# Patient Record
Sex: FEMALE | Race: BLACK OR AFRICAN AMERICAN | NOT HISPANIC OR LATINO | Employment: PART TIME | URBAN - METROPOLITAN AREA
[De-identification: names, ages, dates, MRNs, and addresses within clinical notes are randomized per-mention and may not be internally consistent; named-entity substitution may affect disease eponyms.]

---

## 2019-06-26 ENCOUNTER — HOSPITAL ENCOUNTER (EMERGENCY)
Facility: HOSPITAL | Age: 54
Discharge: HOME | End: 2019-06-26
Attending: EMERGENCY MEDICINE | Admitting: EMERGENCY MEDICINE

## 2019-06-26 ENCOUNTER — APPOINTMENT (EMERGENCY)
Dept: CT IMAGING | Facility: HOSPITAL | Age: 54
End: 2019-06-26

## 2019-06-26 VITALS
RESPIRATION RATE: 16 BRPM | SYSTOLIC BLOOD PRESSURE: 159 MMHG | OXYGEN SATURATION: 100 % | DIASTOLIC BLOOD PRESSURE: 68 MMHG | HEART RATE: 67 BPM | WEIGHT: 149.91 LBS | TEMPERATURE: 98.4 F

## 2019-06-26 DIAGNOSIS — L30.9 ECZEMA: ICD-10-CM

## 2019-06-26 DIAGNOSIS — G43.909 MIGRAINE HEADACHE: Primary | ICD-10-CM

## 2019-06-26 PROCEDURE — 96375 TX/PRO/DX INJ NEW DRUG ADDON: CPT

## 2019-06-26 PROCEDURE — 96374 THER/PROPH/DIAG INJ IV PUSH: CPT

## 2019-06-26 PROCEDURE — 70450 CT HEAD/BRAIN W/O DYE: CPT

## 2019-06-26 PROCEDURE — 99284 EMERGENCY DEPT VISIT MOD MDM: CPT | Performed by: EMERGENCY MEDICINE

## 2019-06-26 PROCEDURE — 96361 HYDRATE IV INFUSION ADD-ON: CPT

## 2019-06-26 PROCEDURE — 99284 EMERGENCY DEPT VISIT MOD MDM: CPT

## 2019-06-26 RX ORDER — CETIRIZINE HYDROCHLORIDE 10 MG/1
10 TABLET ORAL DAILY
Qty: 20 TABLET | Refills: 0 | Status: SHIPPED | OUTPATIENT
Start: 2019-06-26

## 2019-06-26 RX ORDER — METOCLOPRAMIDE HYDROCHLORIDE 5 MG/ML
10 INJECTION INTRAMUSCULAR; INTRAVENOUS ONCE
Status: COMPLETED | OUTPATIENT
Start: 2019-06-26 | End: 2019-06-26

## 2019-06-26 RX ORDER — KETOROLAC TROMETHAMINE 30 MG/ML
30 INJECTION, SOLUTION INTRAMUSCULAR; INTRAVENOUS ONCE
Status: COMPLETED | OUTPATIENT
Start: 2019-06-26 | End: 2019-06-26

## 2019-06-26 RX ORDER — BUTALBITAL/ASPIRIN/CAFFEINE 50-325-40
1 CAPSULE ORAL EVERY 6 HOURS PRN
Qty: 15 CAPSULE | Refills: 0 | Status: SHIPPED | OUTPATIENT
Start: 2019-06-26 | End: 2019-07-06

## 2019-06-26 RX ORDER — TRIAMCINOLONE ACETONIDE 1 MG/G
CREAM TOPICAL 2 TIMES DAILY
Qty: 30 G | Refills: 0 | Status: SHIPPED | OUTPATIENT
Start: 2019-06-26

## 2019-06-26 RX ORDER — DIPHENHYDRAMINE HYDROCHLORIDE 50 MG/ML
25 INJECTION INTRAMUSCULAR; INTRAVENOUS ONCE
Status: COMPLETED | OUTPATIENT
Start: 2019-06-26 | End: 2019-06-26

## 2019-06-26 RX ORDER — NAPROXEN 500 MG/1
500 TABLET ORAL 2 TIMES DAILY WITH MEALS
Qty: 20 TABLET | Refills: 0 | Status: SHIPPED | OUTPATIENT
Start: 2019-06-26

## 2019-06-26 RX ORDER — ONDANSETRON 4 MG/1
4 TABLET, FILM COATED ORAL EVERY 6 HOURS
Qty: 12 TABLET | Refills: 0 | Status: SHIPPED | OUTPATIENT
Start: 2019-06-26

## 2019-06-26 RX ADMIN — KETOROLAC TROMETHAMINE 30 MG: 30 INJECTION, SOLUTION INTRAMUSCULAR; INTRAVENOUS at 10:49

## 2019-06-26 RX ADMIN — SODIUM CHLORIDE 1000 ML: 0.9 INJECTION, SOLUTION INTRAVENOUS at 09:28

## 2019-06-26 RX ADMIN — METOCLOPRAMIDE 10 MG: 5 INJECTION, SOLUTION INTRAMUSCULAR; INTRAVENOUS at 09:30

## 2019-06-26 RX ADMIN — DIPHENHYDRAMINE HYDROCHLORIDE 25 MG: 50 INJECTION, SOLUTION INTRAMUSCULAR; INTRAVENOUS at 09:28

## 2021-05-03 ENCOUNTER — IMMUNIZATIONS (OUTPATIENT)
Dept: FAMILY MEDICINE CLINIC | Facility: HOSPITAL | Age: 56
End: 2021-05-03

## 2021-05-03 DIAGNOSIS — Z23 ENCOUNTER FOR IMMUNIZATION: Primary | ICD-10-CM

## 2021-05-03 PROCEDURE — 91301 SARS-COV-2 / COVID-19 MRNA VACCINE (MODERNA) 100 MCG: CPT

## 2021-05-03 PROCEDURE — 0011A SARS-COV-2 / COVID-19 MRNA VACCINE (MODERNA) 100 MCG: CPT

## 2021-06-12 ENCOUNTER — IMMUNIZATIONS (OUTPATIENT)
Dept: FAMILY MEDICINE CLINIC | Facility: HOSPITAL | Age: 56
End: 2021-06-12

## 2021-06-12 DIAGNOSIS — Z23 ENCOUNTER FOR IMMUNIZATION: Primary | ICD-10-CM

## 2021-06-12 PROCEDURE — 91301 SARS-COV-2 / COVID-19 MRNA VACCINE (MODERNA) 100 MCG: CPT

## 2021-06-12 PROCEDURE — 0012A SARS-COV-2 / COVID-19 MRNA VACCINE (MODERNA) 100 MCG: CPT

## 2024-01-15 ENCOUNTER — ANESTHESIA (INPATIENT)
Dept: PERIOP | Facility: HOSPITAL | Age: 59
DRG: 263 | End: 2024-01-15
Payer: COMMERCIAL

## 2024-01-15 ENCOUNTER — APPOINTMENT (EMERGENCY)
Dept: ULTRASOUND IMAGING | Facility: HOSPITAL | Age: 59
DRG: 263 | End: 2024-01-15
Payer: COMMERCIAL

## 2024-01-15 ENCOUNTER — ANESTHESIA EVENT (INPATIENT)
Dept: PERIOP | Facility: HOSPITAL | Age: 59
DRG: 263 | End: 2024-01-15
Payer: COMMERCIAL

## 2024-01-15 ENCOUNTER — HOSPITAL ENCOUNTER (INPATIENT)
Facility: HOSPITAL | Age: 59
LOS: 1 days | Discharge: HOME | DRG: 263 | End: 2024-01-15
Attending: EMERGENCY MEDICINE | Admitting: SURGERY
Payer: COMMERCIAL

## 2024-01-15 VITALS
WEIGHT: 145.28 LBS | OXYGEN SATURATION: 93 % | HEIGHT: 64 IN | HEART RATE: 75 BPM | BODY MASS INDEX: 24.8 KG/M2 | TEMPERATURE: 97 F | DIASTOLIC BLOOD PRESSURE: 61 MMHG | RESPIRATION RATE: 20 BRPM | SYSTOLIC BLOOD PRESSURE: 116 MMHG

## 2024-01-15 DIAGNOSIS — R10.11 RIGHT UPPER QUADRANT ABDOMINAL PAIN: Primary | ICD-10-CM

## 2024-01-15 DIAGNOSIS — K81.9 CHOLECYSTITIS: ICD-10-CM

## 2024-01-15 PROBLEM — K81.0 ACUTE CHOLECYSTITIS: Status: ACTIVE | Noted: 2024-01-15

## 2024-01-15 LAB
ALBUMIN SERPL BCP-MCNC: 3.8 G/DL (ref 3.5–5)
ALP SERPL-CCNC: 97 U/L (ref 34–104)
ALT SERPL W P-5'-P-CCNC: 10 U/L (ref 7–52)
ANION GAP SERPL CALCULATED.3IONS-SCNC: 7 MMOL/L
AST SERPL W P-5'-P-CCNC: 20 U/L (ref 13–39)
BASOPHILS # BLD AUTO: 0.02 THOUSANDS/ÂΜL (ref 0–0.1)
BASOPHILS NFR BLD AUTO: 0 % (ref 0–1)
BILIRUB SERPL-MCNC: 0.62 MG/DL (ref 0.2–1)
BUN SERPL-MCNC: 13 MG/DL (ref 5–25)
CALCIUM SERPL-MCNC: 9.2 MG/DL (ref 8.4–10.2)
CHLORIDE SERPL-SCNC: 103 MMOL/L (ref 96–108)
CO2 SERPL-SCNC: 29 MMOL/L (ref 21–32)
CREAT SERPL-MCNC: 0.91 MG/DL (ref 0.6–1.3)
EOSINOPHIL # BLD AUTO: 0.29 THOUSAND/ÂΜL (ref 0–0.61)
EOSINOPHIL NFR BLD AUTO: 4 % (ref 0–6)
ERYTHROCYTE [DISTWIDTH] IN BLOOD BY AUTOMATED COUNT: 12.8 % (ref 11.6–15.1)
GFR SERPL CREATININE-BSD FRML MDRD: 69 ML/MIN/1.73SQ M
GLUCOSE SERPL-MCNC: 116 MG/DL (ref 65–140)
HCT VFR BLD AUTO: 39.1 % (ref 34.8–46.1)
HGB BLD-MCNC: 13.1 G/DL (ref 11.5–15.4)
IMM GRANULOCYTES # BLD AUTO: 0.02 THOUSAND/UL (ref 0–0.2)
IMM GRANULOCYTES NFR BLD AUTO: 0 % (ref 0–2)
LIPASE SERPL-CCNC: 7 U/L (ref 11–82)
LYMPHOCYTES # BLD AUTO: 1.54 THOUSANDS/ÂΜL (ref 0.6–4.47)
LYMPHOCYTES NFR BLD AUTO: 19 % (ref 14–44)
MCH RBC QN AUTO: 30.9 PG (ref 26.8–34.3)
MCHC RBC AUTO-ENTMCNC: 33.5 G/DL (ref 31.4–37.4)
MCV RBC AUTO: 92 FL (ref 82–98)
MONOCYTES # BLD AUTO: 0.99 THOUSAND/ÂΜL (ref 0.17–1.22)
MONOCYTES NFR BLD AUTO: 12 % (ref 4–12)
NEUTROPHILS # BLD AUTO: 5.28 THOUSANDS/ÂΜL (ref 1.85–7.62)
NEUTS SEG NFR BLD AUTO: 65 % (ref 43–75)
NRBC BLD AUTO-RTO: 0 /100 WBCS
PLATELET # BLD AUTO: 229 THOUSANDS/UL (ref 149–390)
PMV BLD AUTO: 10.9 FL (ref 8.9–12.7)
POTASSIUM SERPL-SCNC: 4.3 MMOL/L (ref 3.5–5.3)
PROT SERPL-MCNC: 7 G/DL (ref 6.4–8.4)
RBC # BLD AUTO: 4.24 MILLION/UL (ref 3.81–5.12)
SODIUM SERPL-SCNC: 139 MMOL/L (ref 135–147)
WBC # BLD AUTO: 8.14 THOUSAND/UL (ref 4.31–10.16)

## 2024-01-15 PROCEDURE — 99204 OFFICE O/P NEW MOD 45 MIN: CPT | Performed by: SURGERY

## 2024-01-15 PROCEDURE — 0FT44ZZ RESECTION OF GALLBLADDER, PERCUTANEOUS ENDOSCOPIC APPROACH: ICD-10-PCS | Performed by: SURGERY

## 2024-01-15 PROCEDURE — 85025 COMPLETE CBC W/AUTO DIFF WBC: CPT

## 2024-01-15 PROCEDURE — 80053 COMPREHEN METABOLIC PANEL: CPT

## 2024-01-15 PROCEDURE — 96365 THER/PROPH/DIAG IV INF INIT: CPT

## 2024-01-15 PROCEDURE — 99284 EMERGENCY DEPT VISIT MOD MDM: CPT

## 2024-01-15 PROCEDURE — 96366 THER/PROPH/DIAG IV INF ADDON: CPT

## 2024-01-15 PROCEDURE — 47562 LAPAROSCOPIC CHOLECYSTECTOMY: CPT | Performed by: SURGERY

## 2024-01-15 PROCEDURE — 83690 ASSAY OF LIPASE: CPT

## 2024-01-15 PROCEDURE — 76705 ECHO EXAM OF ABDOMEN: CPT

## 2024-01-15 PROCEDURE — 88304 TISSUE EXAM BY PATHOLOGIST: CPT | Performed by: PATHOLOGY

## 2024-01-15 PROCEDURE — 36415 COLL VENOUS BLD VENIPUNCTURE: CPT

## 2024-01-15 PROCEDURE — 96375 TX/PRO/DX INJ NEW DRUG ADDON: CPT

## 2024-01-15 RX ORDER — MAGNESIUM HYDROXIDE 1200 MG/15ML
LIQUID ORAL AS NEEDED
Status: DISCONTINUED | OUTPATIENT
Start: 2024-01-15 | End: 2024-01-15 | Stop reason: HOSPADM

## 2024-01-15 RX ORDER — ROCURONIUM BROMIDE 10 MG/ML
INJECTION, SOLUTION INTRAVENOUS AS NEEDED
Status: DISCONTINUED | OUTPATIENT
Start: 2024-01-15 | End: 2024-01-15

## 2024-01-15 RX ORDER — HYDROMORPHONE HCL/PF 1 MG/ML
1 SYRINGE (ML) INJECTION ONCE
Status: COMPLETED | OUTPATIENT
Start: 2024-01-15 | End: 2024-01-15

## 2024-01-15 RX ORDER — ONDANSETRON 2 MG/ML
4 INJECTION INTRAMUSCULAR; INTRAVENOUS EVERY 6 HOURS PRN
Status: CANCELLED | OUTPATIENT
Start: 2024-01-15

## 2024-01-15 RX ORDER — ONDANSETRON 2 MG/ML
4 INJECTION INTRAMUSCULAR; INTRAVENOUS EVERY 4 HOURS PRN
Status: DISCONTINUED | OUTPATIENT
Start: 2024-01-15 | End: 2024-01-15 | Stop reason: HOSPADM

## 2024-01-15 RX ORDER — PROMETHAZINE HYDROCHLORIDE 25 MG/ML
12.5 INJECTION, SOLUTION INTRAMUSCULAR; INTRAVENOUS ONCE AS NEEDED
Status: DISCONTINUED | OUTPATIENT
Start: 2024-01-15 | End: 2024-01-15 | Stop reason: HOSPADM

## 2024-01-15 RX ORDER — OXYCODONE HYDROCHLORIDE 10 MG/1
10 TABLET ORAL EVERY 4 HOURS PRN
Status: DISCONTINUED | OUTPATIENT
Start: 2024-01-15 | End: 2024-01-15 | Stop reason: HOSPADM

## 2024-01-15 RX ORDER — OXYCODONE HYDROCHLORIDE 5 MG/1
5 TABLET ORAL EVERY 4 HOURS PRN
Status: DISCONTINUED | OUTPATIENT
Start: 2024-01-15 | End: 2024-01-15 | Stop reason: HOSPADM

## 2024-01-15 RX ORDER — PHENYLEPHRINE HCL IN 0.9% NACL 1 MG/10 ML
SYRINGE (ML) INTRAVENOUS AS NEEDED
Status: DISCONTINUED | OUTPATIENT
Start: 2024-01-15 | End: 2024-01-15

## 2024-01-15 RX ORDER — FENTANYL CITRATE 50 UG/ML
INJECTION, SOLUTION INTRAMUSCULAR; INTRAVENOUS AS NEEDED
Status: DISCONTINUED | OUTPATIENT
Start: 2024-01-15 | End: 2024-01-15

## 2024-01-15 RX ORDER — HYDROMORPHONE HCL/PF 1 MG/ML
0.5 SYRINGE (ML) INJECTION
Status: DISCONTINUED | OUTPATIENT
Start: 2024-01-15 | End: 2024-01-15 | Stop reason: HOSPADM

## 2024-01-15 RX ORDER — SODIUM CHLORIDE, SODIUM LACTATE, POTASSIUM CHLORIDE, CALCIUM CHLORIDE 600; 310; 30; 20 MG/100ML; MG/100ML; MG/100ML; MG/100ML
125 INJECTION, SOLUTION INTRAVENOUS CONTINUOUS
Status: DISCONTINUED | OUTPATIENT
Start: 2024-01-15 | End: 2024-01-15 | Stop reason: HOSPADM

## 2024-01-15 RX ORDER — MAGNESIUM HYDROXIDE/ALUMINUM HYDROXICE/SIMETHICONE 120; 1200; 1200 MG/30ML; MG/30ML; MG/30ML
30 SUSPENSION ORAL ONCE
Status: COMPLETED | OUTPATIENT
Start: 2024-01-15 | End: 2024-01-15

## 2024-01-15 RX ORDER — CEFAZOLIN SODIUM 2 G/50ML
2000 SOLUTION INTRAVENOUS EVERY 8 HOURS
Status: DISCONTINUED | OUTPATIENT
Start: 2024-01-15 | End: 2024-01-15 | Stop reason: HOSPADM

## 2024-01-15 RX ORDER — LIDOCAINE HYDROCHLORIDE 20 MG/ML
INJECTION, SOLUTION EPIDURAL; INFILTRATION; INTRACAUDAL; PERINEURAL AS NEEDED
Status: DISCONTINUED | OUTPATIENT
Start: 2024-01-15 | End: 2024-01-15

## 2024-01-15 RX ORDER — CEFAZOLIN SODIUM 1 G/3ML
INJECTION, POWDER, FOR SOLUTION INTRAMUSCULAR; INTRAVENOUS AS NEEDED
Status: DISCONTINUED | OUTPATIENT
Start: 2024-01-15 | End: 2024-01-15

## 2024-01-15 RX ORDER — OXYCODONE HYDROCHLORIDE 5 MG/1
5 TABLET ORAL EVERY 4 HOURS PRN
Qty: 12 TABLET | Refills: 0 | Status: SHIPPED | OUTPATIENT
Start: 2024-01-15 | End: 2024-01-25

## 2024-01-15 RX ORDER — KETOROLAC TROMETHAMINE 30 MG/ML
INJECTION, SOLUTION INTRAMUSCULAR; INTRAVENOUS AS NEEDED
Status: DISCONTINUED | OUTPATIENT
Start: 2024-01-15 | End: 2024-01-15

## 2024-01-15 RX ORDER — PROPOFOL 10 MG/ML
INJECTION, EMULSION INTRAVENOUS AS NEEDED
Status: DISCONTINUED | OUTPATIENT
Start: 2024-01-15 | End: 2024-01-15

## 2024-01-15 RX ORDER — MIDAZOLAM HYDROCHLORIDE 2 MG/2ML
INJECTION, SOLUTION INTRAMUSCULAR; INTRAVENOUS AS NEEDED
Status: DISCONTINUED | OUTPATIENT
Start: 2024-01-15 | End: 2024-01-15

## 2024-01-15 RX ORDER — ONDANSETRON 2 MG/ML
4 INJECTION INTRAMUSCULAR; INTRAVENOUS ONCE AS NEEDED
Status: DISCONTINUED | OUTPATIENT
Start: 2024-01-15 | End: 2024-01-15 | Stop reason: HOSPADM

## 2024-01-15 RX ORDER — ACETAMINOPHEN 325 MG/1
975 TABLET ORAL EVERY 6 HOURS PRN
Status: DISCONTINUED | OUTPATIENT
Start: 2024-01-15 | End: 2024-01-15 | Stop reason: HOSPADM

## 2024-01-15 RX ORDER — SODIUM CHLORIDE, SODIUM GLUCONATE, SODIUM ACETATE, POTASSIUM CHLORIDE, MAGNESIUM CHLORIDE, SODIUM PHOSPHATE, DIBASIC, AND POTASSIUM PHOSPHATE .53; .5; .37; .037; .03; .012; .00082 G/100ML; G/100ML; G/100ML; G/100ML; G/100ML; G/100ML; G/100ML
1000 INJECTION, SOLUTION INTRAVENOUS ONCE
Status: COMPLETED | OUTPATIENT
Start: 2024-01-15 | End: 2024-01-15

## 2024-01-15 RX ORDER — BUPIVACAINE HYDROCHLORIDE AND EPINEPHRINE 2.5; 5 MG/ML; UG/ML
INJECTION, SOLUTION EPIDURAL; INFILTRATION; INTRACAUDAL; PERINEURAL AS NEEDED
Status: DISCONTINUED | OUTPATIENT
Start: 2024-01-15 | End: 2024-01-15 | Stop reason: HOSPADM

## 2024-01-15 RX ORDER — MEPERIDINE HYDROCHLORIDE 25 MG/ML
12.5 INJECTION INTRAMUSCULAR; INTRAVENOUS; SUBCUTANEOUS
Status: DISCONTINUED | OUTPATIENT
Start: 2024-01-15 | End: 2024-01-15 | Stop reason: HOSPADM

## 2024-01-15 RX ORDER — ACETAMINOPHEN 10 MG/ML
1000 INJECTION, SOLUTION INTRAVENOUS ONCE
Status: COMPLETED | OUTPATIENT
Start: 2024-01-15 | End: 2024-01-15

## 2024-01-15 RX ORDER — OXYCODONE HYDROCHLORIDE AND ACETAMINOPHEN 5; 325 MG/1; MG/1
1 TABLET ORAL EVERY 4 HOURS PRN
Status: DISCONTINUED | OUTPATIENT
Start: 2024-01-15 | End: 2024-01-15 | Stop reason: HOSPADM

## 2024-01-15 RX ORDER — KETOROLAC TROMETHAMINE 30 MG/ML
15 INJECTION, SOLUTION INTRAMUSCULAR; INTRAVENOUS ONCE
Status: COMPLETED | OUTPATIENT
Start: 2024-01-15 | End: 2024-01-15

## 2024-01-15 RX ORDER — FENTANYL CITRATE/PF 50 MCG/ML
25 SYRINGE (ML) INJECTION
Status: DISCONTINUED | OUTPATIENT
Start: 2024-01-15 | End: 2024-01-15 | Stop reason: HOSPADM

## 2024-01-15 RX ORDER — ONDANSETRON 2 MG/ML
INJECTION INTRAMUSCULAR; INTRAVENOUS AS NEEDED
Status: DISCONTINUED | OUTPATIENT
Start: 2024-01-15 | End: 2024-01-15

## 2024-01-15 RX ORDER — HEPARIN SODIUM 5000 [USP'U]/ML
5000 INJECTION, SOLUTION INTRAVENOUS; SUBCUTANEOUS EVERY 8 HOURS SCHEDULED
Status: DISCONTINUED | OUTPATIENT
Start: 2024-01-15 | End: 2024-01-15 | Stop reason: HOSPADM

## 2024-01-15 RX ORDER — METRONIDAZOLE 500 MG/100ML
500 INJECTION, SOLUTION INTRAVENOUS EVERY 8 HOURS
Status: DISCONTINUED | OUTPATIENT
Start: 2024-01-15 | End: 2024-01-15 | Stop reason: HOSPADM

## 2024-01-15 RX ORDER — DEXAMETHASONE SODIUM PHOSPHATE 10 MG/ML
INJECTION, SOLUTION INTRAMUSCULAR; INTRAVENOUS AS NEEDED
Status: DISCONTINUED | OUTPATIENT
Start: 2024-01-15 | End: 2024-01-15

## 2024-01-15 RX ADMIN — Medication 50 MCG: at 15:54

## 2024-01-15 RX ADMIN — SUGAMMADEX 100 MG: 100 INJECTION, SOLUTION INTRAVENOUS at 17:33

## 2024-01-15 RX ADMIN — CEFAZOLIN 1000 MG: 1 INJECTION, POWDER, FOR SOLUTION INTRAMUSCULAR; INTRAVENOUS at 15:49

## 2024-01-15 RX ADMIN — ONDANSETRON 4 MG: 2 INJECTION INTRAMUSCULAR; INTRAVENOUS at 13:19

## 2024-01-15 RX ADMIN — FENTANYL CITRATE 100 MCG: 50 INJECTION INTRAMUSCULAR; INTRAVENOUS at 15:54

## 2024-01-15 RX ADMIN — METRONIDAZOLE 500 MG: 500 INJECTION, SOLUTION INTRAVENOUS at 14:09

## 2024-01-15 RX ADMIN — MIDAZOLAM 2 MG: 1 INJECTION INTRAMUSCULAR; INTRAVENOUS at 15:35

## 2024-01-15 RX ADMIN — PROPOFOL 150 MG: 10 INJECTION, EMULSION INTRAVENOUS at 15:54

## 2024-01-15 RX ADMIN — HYDROMORPHONE HYDROCHLORIDE 1 MG: 1 INJECTION, SOLUTION INTRAMUSCULAR; INTRAVENOUS; SUBCUTANEOUS at 11:58

## 2024-01-15 RX ADMIN — LIDOCAINE HYDROCHLORIDE 80 MG: 20 INJECTION, SOLUTION EPIDURAL; INFILTRATION; INTRACAUDAL at 15:54

## 2024-01-15 RX ADMIN — SODIUM CHLORIDE, SODIUM LACTATE, POTASSIUM CHLORIDE, AND CALCIUM CHLORIDE: .6; .31; .03; .02 INJECTION, SOLUTION INTRAVENOUS at 16:24

## 2024-01-15 RX ADMIN — KETOROLAC TROMETHAMINE 30 MG: 30 INJECTION, SOLUTION INTRAMUSCULAR; INTRAVENOUS at 17:15

## 2024-01-15 RX ADMIN — Medication 150 MCG: at 16:04

## 2024-01-15 RX ADMIN — ALUMINUM HYDROXIDE, MAGNESIUM HYDROXIDE, AND SIMETHICONE 30 ML: 200; 200; 20 SUSPENSION ORAL at 10:06

## 2024-01-15 RX ADMIN — SUGAMMADEX 100 MG: 100 INJECTION, SOLUTION INTRAVENOUS at 17:29

## 2024-01-15 RX ADMIN — ONDANSETRON 4 MG: 2 INJECTION INTRAMUSCULAR; INTRAVENOUS at 17:15

## 2024-01-15 RX ADMIN — TRIMETHOBENZAMIDE HYDROCHLORIDE 200 MG: 100 INJECTION INTRAMUSCULAR at 18:55

## 2024-01-15 RX ADMIN — FENTANYL CITRATE 50 MCG: 50 INJECTION INTRAMUSCULAR; INTRAVENOUS at 16:19

## 2024-01-15 RX ADMIN — Medication 100 MCG: at 16:08

## 2024-01-15 RX ADMIN — SODIUM CHLORIDE, SODIUM GLUCONATE, SODIUM ACETATE, POTASSIUM CHLORIDE, MAGNESIUM CHLORIDE, SODIUM PHOSPHATE, DIBASIC, AND POTASSIUM PHOSPHATE 1000 ML: .53; .5; .37; .037; .03; .012; .00082 INJECTION, SOLUTION INTRAVENOUS at 10:06

## 2024-01-15 RX ADMIN — ACETAMINOPHEN 1000 MG: 10 INJECTION INTRAVENOUS at 15:21

## 2024-01-15 RX ADMIN — HEPARIN SODIUM 5000 UNITS: 5000 INJECTION INTRAVENOUS; SUBCUTANEOUS at 14:11

## 2024-01-15 RX ADMIN — CEFAZOLIN SODIUM 2000 MG: 2 SOLUTION INTRAVENOUS at 12:51

## 2024-01-15 RX ADMIN — ROCURONIUM BROMIDE 50 MG: 10 INJECTION, SOLUTION INTRAVENOUS at 15:55

## 2024-01-15 RX ADMIN — FENTANYL CITRATE 50 MCG: 50 INJECTION INTRAMUSCULAR; INTRAVENOUS at 16:13

## 2024-01-15 RX ADMIN — KETOROLAC TROMETHAMINE 15 MG: 30 INJECTION, SOLUTION INTRAMUSCULAR; INTRAVENOUS at 10:04

## 2024-01-15 RX ADMIN — ROCURONIUM BROMIDE 10 MG: 10 INJECTION, SOLUTION INTRAVENOUS at 16:26

## 2024-01-15 RX ADMIN — SUGAMMADEX 200 MG: 100 INJECTION, SOLUTION INTRAVENOUS at 17:15

## 2024-01-15 RX ADMIN — SODIUM CHLORIDE, SODIUM LACTATE, POTASSIUM CHLORIDE, AND CALCIUM CHLORIDE 125 ML/HR: .6; .31; .03; .02 INJECTION, SOLUTION INTRAVENOUS at 12:48

## 2024-01-15 RX ADMIN — DEXAMETHASONE SODIUM PHOSPHATE 10 MG: 10 INJECTION INTRAMUSCULAR; INTRAVENOUS at 16:04

## 2024-01-15 NOTE — ED ATTENDING ATTESTATION
1/15/2024  I, Kalie Fernandes DO, saw and evaluated the patient. I have discussed the patient with the resident/non-physician practitioner and agree with the resident's/non-physician practitioner's findings, Plan of Care, and MDM as documented in the resident's/non-physician practitioner's note, except where noted. All available labs and Radiology studies were reviewed.  I was present for key portions of any procedure(s) performed by the resident/non-physician practitioner and I was immediately available to provide assistance.       At this point I agree with the current assessment done in the Emergency Department.  I have conducted an independent evaluation of this patient a history and physical is as follows:    57 yo F presenting for evaluation of abdominal pain.   Pain localized to RUQ and started after eating [employer] 2 days ago.     Denies fevers, chills, CP, lower abdominal pain, N/V/D/C, urinary sx    MDM: 57 yo F with RUQ abdominal pain- will treat sx, CBC to assess for leukocytosis/anemia, CMP to assess for elevated LFTs/CHARLIE/electrolyte abn, lipase to r/o pancreatitis, US to eval for cholecystitis/cholelithiasis, dispo pending workup    ED Course         Critical Care Time  Procedures

## 2024-01-15 NOTE — ANESTHESIA POSTPROCEDURE EVALUATION
Post-Op Assessment Note    CV Status:  Stable    Pain management: adequate       Mental Status:  Alert and awake   Hydration Status:  Euvolemic   PONV Controlled:  Controlled   Airway Patency:  Patent     Post Op Vitals Reviewed: Yes      Staff: Anesthesiologist               /65 (01/15/24 1826)    Temp      Pulse 79 (01/15/24 1826)   Resp 20 (01/15/24 1826)    SpO2 93 % (01/15/24 1826)

## 2024-01-15 NOTE — ANESTHESIA PREPROCEDURE EVALUATION
Procedure:  CHOLECYSTECTOMY LAPAROSCOPIC (Abdomen)    Relevant Problems   ANESTHESIA (within normal limits)      CARDIO (within normal limits)      ENDO (within normal limits)      GI/HEPATIC (within normal limits)      /RENAL (within normal limits)      GYN (within normal limits)      HEMATOLOGY (within normal limits)      MUSCULOSKELETAL (within normal limits)      NEURO/PSYCH (within normal limits)      PULMONARY (within normal limits)        Physical Exam    Airway    Mallampati score: III  TM Distance: >3 FB  Neck ROM: full     Dental    upper dentures    Cardiovascular  Rhythm: regular, Rate: normal, Cardiovascular exam normal    Pulmonary  Pulmonary exam normal Breath sounds clear to auscultation    Other Findings  post-pubertal.      Anesthesia Plan  ASA Score- 2 Emergent    Anesthesia Type- general with ASA Monitors.         Additional Monitors:     Airway Plan: ETT.           Plan Factors-Exercise tolerance (METS): >4 METS.    Chart reviewed.   Existing labs reviewed. Patient summary reviewed.    Patient is not a current smoker.              Induction- intravenous.    Postoperative Plan- Plan for postoperative opioid use. Planned trial extubation    Informed Consent- Anesthetic plan and risks discussed with patient.

## 2024-01-15 NOTE — Clinical Note
Mariana Gaitan was seen and treated in our emergency department on 1/15/2024.                Diagnosis:     Mariana  may return to work on return date.    She may return on this date: 01/16/2024         If you have any questions or concerns, please don't hesitate to call.      Annette Maria Palladino, DO    ______________________________           _______________          _______________  Hospital Representative                              Date                                Time

## 2024-01-15 NOTE — CASE MANAGEMENT
Case Management Assessment & Discharge Planning Note    Patient name Mariana Gaitan  Location ED-30/ED-30 MRN [id number]  : 1965 Date 1/15/2024       Current Admission Date: 1/15/2024  Current Admission Diagnosis:  There are no problems to display for this patient.     LOS (days): 0  Geometric Mean LOS (GMLOS) (days):   Days to GMLOS:     OBJECTIVE:              Current admission status: Inpatient       Preferred Pharmacy:   [id number] - [place], [place] - [address]  [address] [facility] [address]  Phone: [phone] Fax: [phone]    Primary Care Provider: No primary care provider on file.    Primary Insurance:   Secondary Insurance:     ASSESSMENT:  Active Health Care Proxies    There are no active Health Care Proxies on file.       Advance Directives  Does patient have a Health Care POA?: No  Was patient offered paperwork?: Yes (Provided at bedside)  Does patient currently have a Health Care decision maker?: No  Does patient have Advance Directives?: No  Was patient offered paperwork?: Yes (Provided at bedside)         Readmission Root Cause  30 Day Readmission: No    Patient Information  Admitted from:: Home  Mental Status: Alert  During Assessment patient was accompanied by: Not accompanied during assessment  Assessment information provided by:: Patient  Primary Caregiver: Self  Support Systems: Self, Children  County of Residence: [place]  [facility] you [facility]  Home entry access options. Select all that apply.: Stairs  Number of steps to enter home.: One Flight  Do the steps have railings?: Yes  Type of Current Residence: 3 story home  Living Arrangements: Lives w/ Son  Is patient a ?: No    Activities of Daily Living Prior to Admission  Functional Status: Independent  Completes ADLs independently?: Yes  Ambulates independently?: Yes  Does patient use assisted devices?: No  Does patient currently own DME?: No  Does patient have a history of  Outpatient Therapy (PT/OT)?: No  Does the patient have a history of Short-Term Rehab?: No  Does patient have a history of HHC?: No  Does patient currently have HHC?: No         Patient Information Continued  Income Source: Employed  Does patient have prescription coverage?: Yes  Does patient receive dialysis treatments?: No  Does patient have a history of substance abuse?: No  Does patient have a history of Mental Health Diagnosis?: No    PHQ 2/9 Screening   Reviewed PHQ 2/9 Depression Screening Score?: Yes    Means of Transportation  Means of Transport to Appts:: Drives Self  [place] Application:  (NA)      Housing Stability: Low Risk  (1/15/2024)    Housing Stability Vital Sign     Unable to Pay for Housing in the Last Year: No     Number of Places Lived in the Last Year: 1     Unstable Housing in the Last Year: No   Food Insecurity: No Food Insecurity (1/15/2024)    Hunger Vital Sign     Worried About Running Out of Food in the Last Year: Never true     Ran Out of Food in the Last Year: Never true   Transportation Needs: No Transportation Needs (1/15/2024)    PRAPARE - Transportation     Lack of Transportation (Medical): No     Lack of Transportation (Non-Medical): No   Utilities: Not At Risk (1/15/2024)    C Utilities     Threatened with loss of utilities: No       DISCHARGE DETAILS:       Freedom of Choice:  (NA pending admission)     CM contacted family/caregiver?: No- see comments (Pt aox4)  Were Treatment Team discharge recommendations reviewed with patient/caregiver?:  (NA pending admission)                  Treatment Team Recommendation:  (Pending admission)      Additional Comments: SWCM introduced self and role at bedside for assessment. Pt is pending surgery/medsurg admission at this time. [facility]. Pt denies hxo therapy, DME, MH, and SDOH concerns. Pt given healthcare POA, living will, and 5 wishes advanced directives at bedside upon  request. At this time, pt does not demonstrate CM need however tx team recs pending admission. CM dept continues following through dc.

## 2024-01-15 NOTE — ED PROVIDER NOTES
History  Chief Complaint   Patient presents with    Abdominal Pain     Pt c/o right sided abdominal pain after eating [employer] on Saturday denies n/v/d      Patient is a 58-year-old female presenting to the emergency department for evaluation of right-sided abdominal pain after eating [employer] on Saturday.  Patient notes she tried to treat it at home but is progressively gotten more painful.  She notes when she moves around he gets worse however she lays there it does not hurt.  She notes when she presses on her right upper quadrant it causes pain.  She denies any headaches dizziness tinnitus vision change neck pain back pain numbness or tingling in any of her extremities she denies any nausea vomiting diarrhea constipation dysuria or hematuria.  Has been treating with over-the-counter medications without significant relief.        Prior to Admission Medications   Prescriptions Last Dose Informant Patient Reported? Taking?   cetirizine (ZyrTEC) 10 mg tablet   No No   Sig: Take 1 tablet (10 mg total) by mouth daily   naproxen (NAPROSYN) 500 mg tablet   No No   Sig: Take 1 tablet (500 mg total) by mouth 2 (two) times a day with meals   ondansetron (ZOFRAN) 4 mg tablet   No No   Sig: Take 1 tablet (4 mg total) by mouth every 6 (six) hours   triamcinolone (KENALOG) 0.1 % cream   No No   Sig: Apply topically 2 (two) times a day Apply to affected areas      Facility-Administered Medications: None       History reviewed. No pertinent past medical history.    History reviewed. No pertinent surgical history.    History reviewed. No pertinent family history.  I have reviewed and agree with the history as documented.    E-Cigarette/Vaping     E-Cigarette/Vaping Substances     Social History     Tobacco Use    Smoking status: Former    Smokeless tobacco: Never   Substance Use Topics    Alcohol use: Never    Drug use: Never        Review of Systems   Constitutional:  Negative for activity change, chills and fever.   HENT:   Negative for congestion, ear pain and sore throat.    Eyes:  Negative for pain and visual disturbance.   Respiratory:  Negative for cough and shortness of breath.    Cardiovascular:  Negative for chest pain and palpitations.   Gastrointestinal:  Positive for abdominal pain. Negative for constipation, diarrhea, nausea and vomiting.   Genitourinary:  Negative for dysuria and hematuria.   Musculoskeletal:  Negative for arthralgias and back pain.   Skin:  Negative for color change and rash.   Neurological:  Negative for dizziness, seizures, syncope, weakness, light-headedness and headaches.   Psychiatric/Behavioral:  Negative for agitation and behavioral problems.    All other systems reviewed and are negative.      Physical Exam  ED Triage Vitals   Temperature Pulse Respirations Blood Pressure SpO2   01/15/24 0925 01/15/24 0925 01/15/24 0925 01/15/24 0925 01/15/24 0925   99.1 °F (37.3 °C) 105 18 144/72 100 %      Temp src Heart Rate Source Patient Position - Orthostatic VS BP Location FiO2 (%)   -- 01/15/24 1201 01/15/24 0925 01/15/24 0925 --    Monitor Sitting Right arm       Pain Score       01/15/24 1050       9             Orthostatic Vital Signs  Vitals:    01/15/24 0925 01/15/24 1201   BP: 144/72 130/77   Pulse: 105 95   Patient Position - Orthostatic VS: Sitting Lying       Physical Exam  Vitals and nursing note reviewed.   Constitutional:       General: She is not in acute distress.     Appearance: She is well-developed.   HENT:      Head: Normocephalic and atraumatic.      Mouth/Throat:      Mouth: Mucous membranes are moist.   Eyes:      Extraocular Movements: Extraocular movements intact.      Conjunctiva/sclera: Conjunctivae normal.   Cardiovascular:      Rate and Rhythm: Normal rate and regular rhythm.      Heart sounds: No murmur heard.  Pulmonary:      Effort: Pulmonary effort is normal. No respiratory distress.      Breath sounds: Normal breath sounds.   Abdominal:      General: Abdomen is flat. Bowel  sounds are normal.      Palpations: Abdomen is soft.      Tenderness: There is abdominal tenderness in the right upper quadrant. There is no right CVA tenderness, left CVA tenderness, guarding or rebound. Positive signs include Billings's sign. Negative signs include Rovsing's sign, McBurney's sign and psoas sign.   Musculoskeletal:         General: No swelling.      Cervical back: Neck supple.   Skin:     General: Skin is warm and dry.      Capillary Refill: Capillary refill takes less than 2 seconds.   Neurological:      General: No focal deficit present.      Mental Status: She is alert and oriented to person, place, and time.   Psychiatric:         Mood and Affect: Mood normal.         Behavior: Behavior normal.         ED Medications  Medications   lactated ringers infusion (125 mL/hr Intravenous New Bag 1/15/24 1248)   ondansetron (ZOFRAN) injection 4 mg (4 mg Intravenous Given 1/15/24 1319)   heparin (porcine) subcutaneous injection 5,000 Units (has no administration in time range)   acetaminophen (TYLENOL) tablet 975 mg (has no administration in time range)   oxyCODONE (ROXICODONE) immediate release tablet 10 mg (has no administration in time range)   oxyCODONE (ROXICODONE) IR tablet 5 mg (has no administration in time range)   HYDROmorphone (DILAUDID) injection 0.5 mg (has no administration in time range)   ceFAZolin (ANCEF) IVPB (premix in dextrose) 2,000 mg 50 mL (2,000 mg Intravenous New Bag 1/15/24 1251)   metroNIDAZOLE (FLAGYL) IVPB (premix) 500 mg 100 mL (has no administration in time range)   multi-electrolyte (ISOLYTE-S PH 7.4) bolus 1,000 mL (0 mL Intravenous Stopped 1/15/24 1157)   ketorolac (TORADOL) injection 15 mg (15 mg Intravenous Given 1/15/24 1004)   aluminum-magnesium hydroxide-simethicone (MAALOX) oral suspension 30 mL (30 mL Oral Given 1/15/24 1006)   HYDROmorphone (DILAUDID) injection 1 mg (1 mg Intravenous Given 1/15/24 1158)       Diagnostic Studies  Results Reviewed       Procedure  Component Value Units Date/Time    Comprehensive metabolic panel [[id number]] Collected: 01/15/24 1008    Lab Status: Final result Specimen: Blood from Arm, Left Updated: 01/15/24 1036     Sodium 139 mmol/L      Potassium 4.3 mmol/L      Chloride 103 mmol/L      CO2 29 mmol/L      ANION GAP 7 mmol/L      BUN 13 mg/dL      Creatinine 0.91 mg/dL      Glucose 116 mg/dL      Calcium 9.2 mg/dL      AST 20 U/L      ALT 10 U/L      Alkaline Phosphatase 97 U/L      Total Protein 7.0 g/dL      Albumin 3.8 g/dL      Total Bilirubin 0.62 mg/dL      eGFR 69 ml/min/1.73sq m     Narrative:      National Kidney Disease Foundation guidelines for Chronic Kidney Disease (CKD):     Stage 1 with normal or high GFR (GFR > 90 mL/min/1.73 square meters)    Stage 2 Mild CKD (GFR = 60-89 mL/min/1.73 square meters)    Stage 3A Moderate CKD (GFR = 45-59 mL/min/1.73 square meters)    Stage 3B Moderate CKD (GFR = 30-44 mL/min/1.73 square meters)    Stage 4 Severe CKD (GFR = 15-29 mL/min/1.73 square meters)    Stage 5 End Stage CKD (GFR <15 mL/min/1.73 square meters)  Note: GFR calculation is accurate only with a steady state creatinine    Lipase [[id number]]  (Abnormal) Collected: 01/15/24 1008    Lab Status: Final result Specimen: Blood from Arm, Left Updated: 01/15/24 1036     Lipase 7 u/L     CBC and differential [[id number]] Collected: 01/15/24 1008    Lab Status: Final result Specimen: Blood from Arm, Left Updated: 01/15/24 1013     WBC 8.14 Thousand/uL      RBC 4.24 Million/uL      Hemoglobin 13.1 g/dL      Hematocrit 39.1 %      MCV 92 fL      MCH 30.9 pg      MCHC 33.5 g/dL      RDW 12.8 %      MPV 10.9 fL      Platelets 229 Thousands/uL      nRBC 0 /100 WBCs      Neutrophils Relative 65 %      Immat GRANS % 0 %      Lymphocytes Relative 19 %      Monocytes Relative 12 %      Eosinophils Relative 4 %      Basophils Relative 0 %      Neutrophils Absolute 5.28 Thousands/µL      Immature Grans Absolute 0.02 Thousand/uL      Lymphocytes  Absolute 1.54 Thousands/µL      Monocytes Absolute 0.99 Thousand/µL      Eosinophils Absolute 0.29 Thousand/µL      Basophils Absolute 0.02 Thousands/µL                    US right upper quadrant   Final Result by Susie Peter MD (01/15 1144)      Gallbladder wall thickening, pericholecystic fluid, cholelithiasis, and a positive sonographic Billings's sign, consistent with acute cholecystitis.      Mild pelvocaliectasis of the right kidney.      The study was marked in EPIC for immediate notification.      Workstation performed: [id number]               Procedures  Procedures      ED Course  ED Course as of 01/15/24 1333   Mon Omar 15, 2024   0954 - given the presentation, will check CBC for marked leukocytosis  - CMP for liver enzyme elevation that could signal cholecystitis, biliary obstructive disease. Check RFTs for CHARLIE / markers of dehydration.  - Lipase given abdominal pain to evaluate specifically for pancreatitis.  - Lastly, will consider abdominal imaging.  - RUQ US to r/o gallbladder pathology  - Fluids, toradol, maalox for symptomatic treatment  - Disposition per workup.      1024 WBC: 8.14   1024 Hemoglobin: 13.1   1038 Lipase(!): 7   1038 Sodium: 139   1038 Potassium: 4.3   1147 Gallbladder wall thickening, pericholecystic fluid, cholelithiasis, and a positive sonographic Billings's sign, consistent with acute cholecystitis.     Mild pelvocaliectasis of the right kidney.     The study was marked in EPIC for immediate notification.     Pain medications given and tiger texted surgeyr   1207 Surgery states they will be down to see patient                             SBIRT 22yo+      Flowsheet Row Most Recent Value   Initial Alcohol Screen: US AUDIT-C     1. How often do you have a drink containing alcohol? 0 Filed at: 01/15/2024 0925   2. How many drinks containing alcohol do you have on a typical day you are drinking?  0 Filed at: 01/15/2024 0925   3a. Male UNDER 65: How often do you have five or more  drinks on one occasion? 0 Filed at: 01/15/2024 0925   3b. FEMALE Any Age, or MALE 65+: How often do you have 4 or more drinks on one occassion? 0 Filed at: 01/15/2024 0925   Audit-C Score 0 Filed at: 01/15/2024 0925   RENETTA: How many times in the past year have you...    Used an illegal drug or used a prescription medication for non-medical reasons? Never Filed at: 01/15/2024 0925                  Medical Decision Making  Amount and/or Complexity of Data Reviewed  Labs: ordered. Decision-making details documented in ED Course.  Radiology: ordered.    Risk  OTC drugs.  Prescription drug management.  Decision regarding hospitalization.          Disposition  Final diagnoses:   Right upper quadrant abdominal pain   Cholecystitis     Time reflects when diagnosis was documented in both MDM as applicable and the Disposition within this note       Time User Action Codes Description Comment    1/15/2024  9:55 AM Palladino, Annette Add [R10.11] Right upper quadrant abdominal pain     1/15/2024 11:46 AM Palladino, Annette Add [K81.9] Cholecystitis           ED Disposition       ED Disposition   Admit    Condition   Stable    Date/Time   Mon Omar 15, 2024 1146    Comment   Case was discussed with               Follow-up Information       Follow up With Specialties Details Why Contact Info Additional Information    [facility] Emergency Department Emergency Medicine Go to  As needed, If symptoms worsen [address] [address]  [phone] [facility] Emergency Department, [address], [place], [address]    [facility] Schedule an appointment as soon as possible for a visit  for follow up [address]  [address] [address] [phone] [facility] [place], [address], [place] [address], [place], [place], [address] [phone]            Patient's  Medications   Discharge Prescriptions    No medications on file     No discharge procedures on file.    PDMP Review       None             ED Provider  Attending physically available and evaluated Mariana Gaitan. I managed the patient along with the ED Attending.    Electronically Signed by           Annette Maria Palladino, DO  01/15/24 3797

## 2024-01-15 NOTE — H&P
H&P - General Surgery  : [facility] Surgery Resident role on [employer]  Mariana Gaitan 58 y.o. female MRN: [id number]  Unit/Bed#: ED-30 Encounter: [id number]        Assessment:  58 y.o. year old female with acute cholecystitis    Plan:  Admit to gen surg  Ancef/Flagyl  NPO/IVF  OR this evening lap saqib  SQH dvt ppx    HPI:  Mariana Gaitan is a 58 y.o. female with no significant medical history who presents with 36 hours of abdominal pain. She had significant RUQ pain starting after eating [employer] for dinner Saturday evening. Denies nausea/emesis, endorses anorexia and has not eaten since Saturday night. Presents to ER due to duration of symptoms.    Physical Exam  Constitutional:       General: She is not in acute distress.     Appearance: Normal appearance.   HENT:      Head: Normocephalic and atraumatic.      Right Ear: External ear normal.      Left Ear: External ear normal.      Nose: Nose normal.      Mouth/Throat:      Mouth: Mucous membranes are moist.      Pharynx: Oropharynx is clear.   Eyes:      General:         Right eye: No discharge.         Left eye: No discharge.      Extraocular Movements: Extraocular movements intact.      Conjunctiva/sclera: Conjunctivae normal.      Pupils: Pupils are equal, round, and reactive to light.   Cardiovascular:      Rate and Rhythm: Normal rate.      Pulses: Normal pulses.      Heart sounds: Normal heart sounds.   Pulmonary:      Effort: Pulmonary effort is normal. No respiratory distress.   Abdominal:      General: Abdomen is flat. There is no distension.      Palpations: Abdomen is soft.      Tenderness: There is abdominal tenderness (RUQ moderate). There is no guarding or rebound.   Musculoskeletal:         General: No swelling, tenderness or signs of injury.      Cervical back: Normal range of motion and neck supple. No rigidity or tenderness.   Skin:     Coloration: Skin is not jaundiced.      Findings: No lesion.   Neurological:      General: No focal  deficit present.      Mental Status: She is alert and oriented to person, place, and time. Mental status is at baseline.   Psychiatric:         Mood and Affect: Mood normal.         Behavior: Behavior normal.            Review of Systems   Constitutional:  Positive for appetite change. Negative for chills and fever.   HENT:  Negative for ear pain and sore throat.    Eyes:  Negative for pain and visual disturbance.   Respiratory:  Negative for cough and shortness of breath.    Cardiovascular:  Negative for chest pain and palpitations.   Gastrointestinal:  Positive for abdominal pain. Negative for nausea and vomiting.   Genitourinary:  Negative for dysuria and hematuria.   Musculoskeletal:  Negative for arthralgias and back pain.   Skin:  Negative for color change and rash.   Neurological:  Negative for seizures and syncope.   All other systems reviewed and are negative.       Objective         Intake/Output Summary (Last 24 hours) at 1/15/2024 1248  Last data filed at 1/15/2024 1157  Gross per 24 hour   Intake 1000 ml   Output --   Net 1000 ml       First Vitals:   Blood Pressure: 144/72 (01/15/24 0925)  Pulse: 105 (01/15/24 0925)  Temperature: 99.1 °F (37.3 °C) (01/15/24 0925)  Respirations: 18 (01/15/24 0925)  Weight - Scale: 65.9 kg (145 lb 4.5 oz) (01/15/24 0921)  SpO2: 100 % (01/15/24 0925)    Current Vitals:   Blood Pressure: 130/77 (01/15/24 1201)  Pulse: 95 (01/15/24 1201)  Temperature: 99.1 °F (37.3 °C) (01/15/24 0925)  Respirations: 18 (01/15/24 1201)  Weight - Scale: 65.9 kg (145 lb 4.5 oz) (01/15/24 0921)  SpO2: 100 % (01/15/24 1201)    Invasive Devices       Peripheral Intravenous Line  Duration             Peripheral IV 01/15/24 Left Antecubital <1 day                    Imaging: I have personally reviewed pertinent reports.      US right upper quadrant    Result Date: 1/15/2024  Impression: Gallbladder wall thickening, pericholecystic fluid, cholelithiasis, and a positive sonographic Billings's sign,  consistent with acute cholecystitis. Mild pelvocaliectasis of the right kidney. The study was marked in EPIC for immediate notification. Workstation performed: [id number]       EKG, Pathology, and Other Studies: I have personally reviewed pertinent reports.    VTE Pharmacologic Prophylaxis: Heparin  VTE Mechanical Prophylaxis: sequential compression device    Historical Information   History reviewed. No pertinent past medical history.  History reviewed. No pertinent surgical history.  Social History   Social History     Substance and Sexual Activity   Alcohol Use Never     Social History     Substance and Sexual Activity   Drug Use Never     Social History     Tobacco Use   Smoking Status Former   Smokeless Tobacco Never     History reviewed. No pertinent family history.    Meds/Allergies   all current active meds have been reviewed, current meds:   Current Facility-Administered Medications   Medication Dose Route Frequency    acetaminophen (TYLENOL) tablet 975 mg  975 mg Oral Q6H PRN    ceFAZolin (ANCEF) IVPB (premix in dextrose) 2,000 mg 50 mL  2,000 mg Intravenous Q8H    heparin (porcine) subcutaneous injection 5,000 Units  5,000 Units Subcutaneous Q8H DENNISE    HYDROmorphone (DILAUDID) injection 0.5 mg  0.5 mg Intravenous Q3H PRN    lactated ringers infusion  125 mL/hr Intravenous Continuous    metroNIDAZOLE (FLAGYL) IVPB (premix) 500 mg 100 mL  500 mg Intravenous Q8H    ondansetron (ZOFRAN) injection 4 mg  4 mg Intravenous Q4H PRN    oxyCODONE (ROXICODONE) immediate release tablet 10 mg  10 mg Oral Q4H PRN    oxyCODONE (ROXICODONE) IR tablet 5 mg  5 mg Oral Q4H PRN    and PTA meds:   Prior to Admission Medications   Prescriptions Last Dose Informant Patient Reported? Taking?   cetirizine (ZyrTEC) 10 mg tablet   No No   Sig: Take 1 tablet (10 mg total) by mouth daily   naproxen (NAPROSYN) 500 mg tablet   No No   Sig: Take 1 tablet (500 mg total) by mouth 2 (two) times a day with meals   ondansetron (ZOFRAN) 4 mg  tablet   No No   Sig: Take 1 tablet (4 mg total) by mouth every 6 (six) hours   triamcinolone (KENALOG) 0.1 % cream   No No   Sig: Apply topically 2 (two) times a day Apply to affected areas      Facility-Administered Medications: None     Allergies   Allergen Reactions    Shellfish Allergy - Food Allergy Anaphylaxis    Iodine - Food Allergy Swelling       Lab Results: I have personally reviewed pertinent lab results.  , CBC:   Lab Results   Component Value Date    WBC 8.14 01/15/2024    HGB 13.1 01/15/2024    HCT 39.1 01/15/2024    MCV 92 01/15/2024     01/15/2024    RBC 4.24 01/15/2024    MCH 30.9 01/15/2024    MCHC 33.5 01/15/2024    RDW 12.8 01/15/2024    MPV 10.9 01/15/2024    NRBC 0 01/15/2024   , CMP:   Lab Results   Component Value Date    SODIUM 139 01/15/2024    K 4.3 01/15/2024     01/15/2024    CO2 29 01/15/2024    BUN 13 01/15/2024    CREATININE 0.91 01/15/2024    CALCIUM 9.2 01/15/2024    AST 20 01/15/2024    ALT 10 01/15/2024    ALKPHOS 97 01/15/2024    EGFR 69 01/15/2024       Counseling / Coordination of Care  Total floor / unit time spent today 25 minutes.  Greater than 50% of total time was spent with the patient and / or family counseling and / or coordination of care.        Markos Crandall MD  1/15/2024 12:48 PM

## 2024-01-15 NOTE — OP NOTE
OPERATIVE REPORT  PATIENT NAME: Mariana Gaitan    :  1965  MRN: [id number]  Pt Location: [facility] OR ROOM 03    SURGERY DATE: 1/15/2024    Surgeons and Role:     * Kelby Perkins MD - Primary     * Markos Crandall MD - Assisting    Preop Diagnosis:  Right upper quadrant abdominal pain [R10.11]  Cholecystitis [K81.9]    Post-Op Diagnosis Codes:     * Right upper quadrant abdominal pain [R10.11]     * Cholecystitis [K81.9]    Procedure(s):  CHOLECYSTECTOMY LAPAROSCOPIC    Specimen(s):  ID Type Source Tests Collected by Time Destination   1 : GALLBLADDER Tissue Gallbladder TISSUE EXAM Kelby Perkins MD 1/15/2024 1653        Estimated Blood Loss:   Minimal    Drains:  * No LDAs found *    Anesthesia Type:   General    Operative Indications:  Right upper quadrant abdominal pain [R10.11]  Cholecystitis [K81.9]      Operative Findings:  Acute calculus cholecystitis    Complications:   None    Procedure and Technique:    The patient was seen again in the Holding Room. The risks, benefits, complications, treatment options, and expected outcomes were discussed with the patient. The possibilities of reaction to medication, pulmonary aspiration, perforation of viscus, bleeding, recurrent infection, finding a normal gallbladder, the need for additional procedures, failure to diagnose a condition, the possible need to convert to an open procedure, and creating a complication requiring transfusion or operation were discussed with the patient. The patient and/or family concurred with the proposed plan, giving informed consent. The site of surgery properly noted/marked. The patient was taken to Operating Room, identified as Mariana Gaitan  and the procedure verified as Laparoscopic Cholecystectomy with possible Intraoperative Cholangiogram. A Time Out was held after prepping and draping in sterile fashion.  The above information was confirmed.    Prior to the induction of general anesthesia, antibiotic prophylaxis was  administered. General endotracheal anesthesia was then administered and tolerated well. After the induction, the abdomen was prepped in the usual sterile fashion. The patient was positioned in the supine position, along with some reverse Trendelenburg.    Local anesthetic agent was injected into the skin near the umbilicus and an incision made. The midline fascia was incised and the open technique was used to introduce a  port under direct vision.  Pneumoperitoneum was then created with CO2 and was tolerated well without any adverse changes in the patient's vital signs. Additional trocars were introduced under direct vision. All skin incisions were infiltrated with a local anesthetic agent before making the incision and placing the trocars.  The patient was placed in reverse Trendelenburg position.    The gallbladder was identified, the fundus grasped and retracted cephalad. Adhesions were lysed bluntly and with the electrocautery where indicated, taking care not to injure any adjacent organs or viscus. The infundibulum was grasped and retracted laterally, exposing the peritoneum overlying the triangle of Calot. This was then dissected anteriorily and posteriorly and exposed in a blunt fashion or using cautery where appropriate. The cystic duct was clearly identified and  dissected circumferentially, as was the cystic artery, as the only two tubular structures leading into the gallbladder .  The critical view of the Brink[place] of Calot was identified.  The posterior aspect of the gallbladder was lifted off the cystic plate, to insure that there were no posterior structres behind the gallbladder.      Once this was all clearly identified, the cystic duct was then doubly ligated with surgical clips and/or Endoloop suture on the patient side and singly clipped on the gallbladder side and divided. The cystic artery was re-identified,  ligated with clips and divided as well.   The gallbladder was dissected from the liver  bed in retrograde fashion with the electrocautery. The gallbladder was placed into an endocatch bag and secured.  The liver bed was irrigated and inspected. Hemostasis was achieved with the electrocautery. Copious irrigation was utilized and was repeatedly aspirated until clear.    The camera was then switched to the lateral port and directed back to the midline. The specimen was removed under direct visualization from the midline incision.   Pneumoperitoneum was completely reduced after viewing removal of the trocars under direct vision.  The fascia is closed with 2 figure-of-eight 0 Vicryl sutures.  The wound was thoroughly irrigated. The skin was then closed with 4-0 monocryl and histacryl.    Instrument, sponge, and needle counts were correct at closure and at the conclusion of the case.    I was present for the entire procedure.    Patient Disposition:  PACU         SIGNATURE: Kelby Perkins MD  DATE: January 15, 2024  TIME: 5:20 PM

## 2024-01-17 PROCEDURE — 88304 TISSUE EXAM BY PATHOLOGIST: CPT | Performed by: PATHOLOGY

## 2024-01-31 ENCOUNTER — OFFICE VISIT (OUTPATIENT)
Dept: SURGERY | Facility: CLINIC | Age: 59
End: 2024-01-31

## 2024-01-31 VITALS
DIASTOLIC BLOOD PRESSURE: 75 MMHG | OXYGEN SATURATION: 97 % | BODY MASS INDEX: 24.52 KG/M2 | HEIGHT: 64 IN | RESPIRATION RATE: 19 BRPM | TEMPERATURE: 98.3 F | WEIGHT: 143.6 LBS | HEART RATE: 88 BPM | SYSTOLIC BLOOD PRESSURE: 109 MMHG

## 2024-01-31 DIAGNOSIS — Z09 POSTOP CHECK: Primary | ICD-10-CM

## 2024-01-31 PROCEDURE — 99024 POSTOP FOLLOW-UP VISIT: CPT | Performed by: SURGERY

## 2024-01-31 NOTE — PROGRESS NOTES
"GENERAL SURGERY POST-OP NOTE  Mariana Gaitan   MRN: [id number]   : 1965  2024  Chief Complaint   Patient presents with    Post-op     NP POST OP JEANNETTE REED-ER 1/15/24     Assessment/Plan   There are no diagnoses linked to this encounter.  Patient is doing well status post-operative laparoscopic cholecystectomy.  Post-operative care restrictions discussed. May return to work when ready.    she is progressing nicely.  I will see her back on an as needed basis.  Pathology reviewed showing acute and chronic cholecystitis and cholelithiasis    Subjective   The patient is a 58 y.o. female who presents for routine post-operative follow up status post cholecystectomy.  She denies any present complaints. Activity level has been appropriate. She is tolerating a regular diet. Pain is well-controlled with current therapy.    The following portions of the patient's history were reviewed by a provider in this encounter and updated as appropriate:    No text in SmartText           Objective   Physical Exam:  /75 (BP Location: Left arm, Patient Position: Sitting, Cuff Size: Standard)   Pulse 88   Temp 98.3 °F (36.8 °C) (Temporal)   Resp 19   Ht 5' 4\" (1.626 m)   Wt 65.1 kg (143 lb 9.6 oz)   SpO2 97%   BMI 24.65 kg/m²   Constitutional: not in acute distress, well developed and well nourished  Abdomen: soft, bowel sounds active, non-tender, no abnormal masses  Incision: healing well, no drainage, no erythema, well approximated  Tenderness at incision site: mild    Current Outpatient Medications   Medication Sig Dispense Refill    cetirizine (ZyrTEC) 10 mg tablet Take 1 tablet (10 mg total) by mouth daily (Patient not taking: Reported on 2024) 20 tablet 0    naproxen (NAPROSYN) 500 mg tablet Take 1 tablet (500 mg total) by mouth 2 (two) times a day with meals (Patient not taking: Reported on 2024) 20 tablet 0    ondansetron (ZOFRAN) 4 mg tablet Take 1 tablet (4 mg total) by mouth every 6 (six) " hours (Patient not taking: Reported on 1/31/2024) 12 tablet 0    triamcinolone (KENALOG) 0.1 % cream Apply topically 2 (two) times a day Apply to affected areas (Patient not taking: Reported on 1/31/2024) 30 g 0     No current facility-administered medications for this visit.     Shellfish allergy - food allergy and Iodine - food allergy   History reviewed. No pertinent past medical history.    Kelby Perkins MD

## 2024-06-07 ENCOUNTER — APPOINTMENT (OUTPATIENT)
Dept: LAB | Facility: HOSPITAL | Age: 59
End: 2024-06-07
Payer: COMMERCIAL

## 2024-06-07 ENCOUNTER — OFFICE VISIT (OUTPATIENT)
Dept: FAMILY MEDICINE CLINIC | Facility: CLINIC | Age: 59
End: 2024-06-07

## 2024-06-07 VITALS
DIASTOLIC BLOOD PRESSURE: 60 MMHG | RESPIRATION RATE: 14 BRPM | TEMPERATURE: 97.7 F | HEART RATE: 92 BPM | BODY MASS INDEX: 24.41 KG/M2 | HEIGHT: 64 IN | OXYGEN SATURATION: 100 % | WEIGHT: 143 LBS | SYSTOLIC BLOOD PRESSURE: 150 MMHG

## 2024-06-07 DIAGNOSIS — G43.709 CHRONIC MIGRAINE WITHOUT AURA WITHOUT STATUS MIGRAINOSUS, NOT INTRACTABLE: ICD-10-CM

## 2024-06-07 DIAGNOSIS — B35.9 TINEA: ICD-10-CM

## 2024-06-07 DIAGNOSIS — F41.9 MILD ANXIETY: ICD-10-CM

## 2024-06-07 DIAGNOSIS — Z76.89 ENCOUNTER TO ESTABLISH CARE WITH NEW DOCTOR: Primary | ICD-10-CM

## 2024-06-07 DIAGNOSIS — F43.21 COMPLICATED GRIEF: ICD-10-CM

## 2024-06-07 DIAGNOSIS — Z01.00 ROUTINE EYE EXAM: ICD-10-CM

## 2024-06-07 LAB
ALBUMIN SERPL BCP-MCNC: 4.6 G/DL (ref 3.5–5)
ALP SERPL-CCNC: 92 U/L (ref 34–104)
ALT SERPL W P-5'-P-CCNC: 10 U/L (ref 7–52)
ANION GAP SERPL CALCULATED.3IONS-SCNC: 9 MMOL/L (ref 4–13)
AST SERPL W P-5'-P-CCNC: 17 U/L (ref 13–39)
BACTERIA UR QL AUTO: ABNORMAL /HPF
BASOPHILS # BLD AUTO: 0.03 THOUSANDS/ÂΜL (ref 0–0.1)
BASOPHILS NFR BLD AUTO: 1 % (ref 0–1)
BILIRUB SERPL-MCNC: 0.43 MG/DL (ref 0.2–1)
BILIRUB UR QL STRIP: NEGATIVE
BUN SERPL-MCNC: 24 MG/DL (ref 5–25)
CALCIUM SERPL-MCNC: 10.1 MG/DL (ref 8.4–10.2)
CHLORIDE SERPL-SCNC: 102 MMOL/L (ref 96–108)
CLARITY UR: CLEAR
CO2 SERPL-SCNC: 28 MMOL/L (ref 21–32)
COLOR UR: ABNORMAL
CREAT SERPL-MCNC: 0.96 MG/DL (ref 0.6–1.3)
EOSINOPHIL # BLD AUTO: 0.12 THOUSAND/ÂΜL (ref 0–0.61)
EOSINOPHIL NFR BLD AUTO: 3 % (ref 0–6)
ERYTHROCYTE [DISTWIDTH] IN BLOOD BY AUTOMATED COUNT: 13.1 % (ref 11.6–15.1)
GFR SERPL CREATININE-BSD FRML MDRD: 64 ML/MIN/1.73SQ M
GLUCOSE P FAST SERPL-MCNC: 91 MG/DL (ref 65–99)
GLUCOSE UR STRIP-MCNC: NEGATIVE MG/DL
HCT VFR BLD AUTO: 41.7 % (ref 34.8–46.1)
HGB BLD-MCNC: 14.2 G/DL (ref 11.5–15.4)
HGB UR QL STRIP.AUTO: NEGATIVE
HYALINE CASTS #/AREA URNS LPF: ABNORMAL /LPF
IMM GRANULOCYTES # BLD AUTO: 0.01 THOUSAND/UL (ref 0–0.2)
IMM GRANULOCYTES NFR BLD AUTO: 0 % (ref 0–2)
KETONES UR STRIP-MCNC: ABNORMAL MG/DL
LEUKOCYTE ESTERASE UR QL STRIP: 25
LYMPHOCYTES # BLD AUTO: 1.82 THOUSANDS/ÂΜL (ref 0.6–4.47)
LYMPHOCYTES NFR BLD AUTO: 41 % (ref 14–44)
MCH RBC QN AUTO: 31.1 PG (ref 26.8–34.3)
MCHC RBC AUTO-ENTMCNC: 34.1 G/DL (ref 31.4–37.4)
MCV RBC AUTO: 91 FL (ref 82–98)
MONOCYTES # BLD AUTO: 0.5 THOUSAND/ÂΜL (ref 0.17–1.22)
MONOCYTES NFR BLD AUTO: 11 % (ref 4–12)
NEUTROPHILS # BLD AUTO: 1.92 THOUSANDS/ÂΜL (ref 1.85–7.62)
NEUTS SEG NFR BLD AUTO: 44 % (ref 43–75)
NITRITE UR QL STRIP: NEGATIVE
NON-SQ EPI CELLS URNS QL MICRO: ABNORMAL /HPF
NRBC BLD AUTO-RTO: 0 /100 WBCS
PH UR STRIP.AUTO: 6 [PH]
PLATELET # BLD AUTO: 310 THOUSANDS/UL (ref 149–390)
PMV BLD AUTO: 11.5 FL (ref 8.9–12.7)
POTASSIUM SERPL-SCNC: 4.4 MMOL/L (ref 3.5–5.3)
PROT SERPL-MCNC: 8.1 G/DL (ref 6.4–8.4)
PROT UR STRIP-MCNC: ABNORMAL MG/DL
RBC # BLD AUTO: 4.57 MILLION/UL (ref 3.81–5.12)
RBC #/AREA URNS AUTO: ABNORMAL /HPF
SODIUM SERPL-SCNC: 139 MMOL/L (ref 135–147)
SP GR UR STRIP.AUTO: 1.02 (ref 1–1.04)
TSH SERPL DL<=0.05 MIU/L-ACNC: 1.95 UIU/ML (ref 0.45–4.5)
UROBILINOGEN UA: NEGATIVE MG/DL
WBC # BLD AUTO: 4.4 THOUSAND/UL (ref 4.31–10.16)
WBC #/AREA URNS AUTO: ABNORMAL /HPF

## 2024-06-07 PROCEDURE — 85025 COMPLETE CBC W/AUTO DIFF WBC: CPT

## 2024-06-07 PROCEDURE — 81001 URINALYSIS AUTO W/SCOPE: CPT

## 2024-06-07 PROCEDURE — 84443 ASSAY THYROID STIM HORMONE: CPT

## 2024-06-07 PROCEDURE — 80053 COMPREHEN METABOLIC PANEL: CPT

## 2024-06-07 PROCEDURE — 36415 COLL VENOUS BLD VENIPUNCTURE: CPT

## 2024-06-07 RX ORDER — TOPIRAMATE SPINKLE 25 MG/1
CAPSULE ORAL
Qty: 50 CAPSULE | Refills: 0 | Status: SHIPPED | OUTPATIENT
Start: 2024-06-07

## 2024-06-07 RX ORDER — IBUPROFEN 800 MG/1
800 TABLET ORAL EVERY 8 HOURS PRN
Qty: 30 TABLET | Refills: 0 | Status: SHIPPED | OUTPATIENT
Start: 2024-06-07

## 2024-06-07 RX ORDER — PRENATAL VIT 91/IRON/FOLIC/DHA 28-975-200
COMBINATION PACKAGE (EA) ORAL 2 TIMES DAILY
Qty: 42 G | Refills: 0 | Status: SHIPPED | OUTPATIENT
Start: 2024-06-07

## 2024-06-07 RX ORDER — SUMATRIPTAN 25 MG/1
25 TABLET, FILM COATED ORAL ONCE AS NEEDED
Qty: 20 TABLET | Refills: 0 | Status: CANCELLED | OUTPATIENT
Start: 2024-06-07

## 2024-06-07 NOTE — PROGRESS NOTES
Ambulatory Visit  Name: Mariana Gaitan      : 1965      MRN: [id number]  Encounter Provider: Rebecca Fay Kab-Perlman, MD  Encounter Date: 2024   Encounter department: [facility]    Assessment & Plan   1. Encounter to establish care with new doctor  2. Chronic migraine without aura without status migrainosus, not intractable  Assessment & Plan:  -PHQ-9 negative, IVÁN-7 positive for mild anxiety may be contributory   -relief lasting only several hours with aleve/ibuprofen    PLAN  -UA, TSH, CBC, CMP  -preventative with topamax 25mg for first two weeks followed by 50mg thereafter, ibuprofen 800mg for abortive with counseling including to drink plenty of water   Orders:  -     topiramate (TOPAMAX) 25 mg sprinkle capsule; Use one tablet daily for week 1 and 2, then two tablets daily for week 3 and 4, and two tablets thereafter  -     ibuprofen (MOTRIN) 800 mg tablet; Take 1 tablet (800 mg total) by mouth every 8 (eight) hours as needed for mild pain  -     TSH, 3rd generation with Free T4 reflex; Future  -     Comprehensive metabolic panel; Future  -     CBC and differential; Future  -     Urinalysis with microscopic; Future  3. Routine eye exam  Comments:  -states was told has macular degeneration and glaucoma, referral placed to ophthomology  Orders:  -     Ambulatory Referral to Ophthalmology; Future  4. Tinea  Comments:  -terbinafine cream with counseling/education including how to use appropriately  Orders:  -     terbinafine (LamISIL) 1 % cream; Apply topically 2 (two) times a day  5. Complicated grief  Assessment & Plan:  -continues 8 years post eldest son's death from cancer    PLAN  -grief counseling provided  -consider referral to mental health on future visit   -f/u one month   6. Mild anxiety  Assessment & Plan:  -IVÁN-7 score of 9  -caused by need to pay mortgage and bills and being primary breadwinner  -has support system   -may be related to grief  "    PLAN  -manage migraines, may improve with better sleep which is interrupted by migraines       History of Present Illness       Mariana Gaitan is a 57 yo female patient presenting today to establish care. Concerns today include headaches. Since age 51 has had headaches. Used to live in [place]. Has 4 boys and one passed from Cancer, the firstborn child, 8 years ago. Still thinks about him, \":I can't get over it.\" Since he passed away has lots of headaches. Goes to bed around 5am then has to get up around 11am, sometimes less gets up sooner. Has tried OTC: advil, aleve, none of them work. The headaches wake her up if sleeping. Positive for photophobia, phonophobia. Affects vision . Ice on back of neck provides reliefe along with sitting in a dark room.    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     Age 14 when first got period, last period around age 48    Goes to [employer]. Has support system. Has a best friend from when she was a child who travels back/forth to [place]. Currently she lives with her 27 yo son in a house she owns but he doesn't contribute much. Has lots of pressure because she is still paying off the mortgage by herself. The father of her children she left a long time ago because he cheated on her.    State she has anxiety regarding mortgage, water, electric, phone bill    The following portions of the patient's history were reviewed and updated as appropriate: allergies, current medications, past family history, past medical history, past social history, past surgical history, and problem list.      Review of Systems   Constitutional:  Negative for fatigue and fever.   Eyes:  Positive for photophobia. Negative for visual disturbance.   Respiratory:  Negative for cough and shortness of breath.    Cardiovascular:  Negative for chest pain and palpitations.   Skin:  Positive for rash.   Neurological:  Positive for headaches. Negative for " "dizziness, weakness, light-headedness and numbness.       Objective     /60 (BP Location: Right arm, Patient Position: Sitting, Cuff Size: Large)   Pulse 92   Temp 97.7 °F (36.5 °C) (Temporal)   Resp 14   Ht 5' 4\" (1.626 m)   Wt 64.9 kg (143 lb)   SpO2 100%   BMI 24.55 kg/m²     Physical Exam  Constitutional:       Appearance: Normal appearance. She is normal weight.   HENT:      Head: Normocephalic and atraumatic.      Right Ear: External ear normal.      Left Ear: External ear normal.      Mouth/Throat:      Mouth: Mucous membranes are moist.      Pharynx: Oropharynx is clear.   Eyes:      Extraocular Movements: Extraocular movements intact.      Conjunctiva/sclera: Conjunctivae normal.   Cardiovascular:      Rate and Rhythm: Normal rate and regular rhythm.      Pulses: Normal pulses.      Heart sounds: Normal heart sounds. No murmur heard.     No friction rub. No gallop.   Pulmonary:      Effort: Pulmonary effort is normal.      Breath sounds: Normal breath sounds. No wheezing or rales.   Abdominal:      General: Abdomen is flat. Bowel sounds are normal. There is no distension.      Palpations: Abdomen is soft.      Tenderness: There is no abdominal tenderness.   Musculoskeletal:         General: Normal range of motion.      Cervical back: Normal range of motion.      Right lower leg: No edema.      Left lower leg: No edema.   Skin:     Capillary Refill: Capillary refill takes less than 2 seconds.      Findings: Rash (erythematous raised rash on bilateral necklines where hair touches neck) present.   Neurological:      General: No focal deficit present.      Mental Status: She is alert.   Psychiatric:         Mood and Affect: Mood normal.         Behavior: Behavior normal.         Thought Content: Thought content normal.         Judgment: Judgment normal.           Administrative Statements         "

## 2024-06-10 PROBLEM — F41.9 MILD ANXIETY: Status: ACTIVE | Noted: 2024-06-10

## 2024-06-10 PROBLEM — F43.21 COMPLICATED GRIEF: Status: ACTIVE | Noted: 2024-06-10

## 2024-06-10 PROBLEM — G43.709 CHRONIC MIGRAINE WITHOUT AURA WITHOUT STATUS MIGRAINOSUS, NOT INTRACTABLE: Status: ACTIVE | Noted: 2024-06-10

## 2024-06-10 NOTE — ASSESSMENT & PLAN NOTE
-PHQ-9 negative, IVÁN-7 positive for mild anxiety may be contributory   -relief lasting only several hours with aleve/ibuprofen    PLAN  -UA, TSH, CBC, CMP  -preventative with topamax 25mg for first two weeks followed by 50mg thereafter, ibuprofen 800mg for abortive with counseling including to drink plenty of water   -f/u one month

## 2024-06-10 NOTE — ASSESSMENT & PLAN NOTE
-continues 8 years post eldest son's death from cancer    PLAN  -grief counseling provided  -consider referral to mental health on future visit   -f/u one month

## 2024-06-10 NOTE — ASSESSMENT & PLAN NOTE
-IVÁN-7 score of 9  -caused by need to pay mortgage and bills and being primary breadwinner  -has support system   -may be related to grief     PLAN  -manage migraines, may improve with better sleep which is interrupted by migraines

## 2024-06-21 ENCOUNTER — TELEPHONE (OUTPATIENT)
Dept: FAMILY MEDICINE CLINIC | Facility: CLINIC | Age: 59
End: 2024-06-21

## 2024-06-24 ENCOUNTER — TELEPHONE (OUTPATIENT)
Dept: FAMILY MEDICINE CLINIC | Facility: CLINIC | Age: 59
End: 2024-06-24

## 2024-06-24 DIAGNOSIS — G43.709 CHRONIC MIGRAINE WITHOUT AURA WITHOUT STATUS MIGRAINOSUS, NOT INTRACTABLE: Primary | ICD-10-CM

## 2024-06-24 RX ORDER — IBUPROFEN 800 MG/1
800 TABLET ORAL EVERY 8 HOURS PRN
Qty: 60 TABLET | Refills: 2 | Status: SHIPPED | OUTPATIENT
Start: 2024-06-24

## 2024-06-24 NOTE — PROGRESS NOTES
Patient called me on my [employer] teams number today: states topamax not effective for her chronic migraines. Used 25mg for first week then 50 mg for second weak with no reduction in debilitating headaches. States using ibuprofen 800mg every 8 hours and that works for her; taking with food and with lots of water as instructed.    Regarding sleep, exercise, and die: her sleep is okay but as previously discovered the headaches cause her to wake up from her sleep. She does not exercise regularly aside from walking 2/3 times a week for short distances. One coffee every morning, no chocolate, no liquor because gives her a headache, no smoking, lots of stress because of son who passed away from cancer 8 years ago. States after that was given a blood pressure pill that tasted sweet like candy so she stopped it, unable to recall name.    Regarding her blood pressure at our last office visit it was 150/60 without manual repeat. Today stating she does not check her bp at home.   States when lies down sees white flashing in eyes/floaters. 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

## 2024-06-24 NOTE — TELEPHONE ENCOUNTER
Called to review results of labs. Left message explained the UA may indicate UTI however in the absence of symptoms okay waiting until f/u visit. If symptomatic will treat. Requested she call me back to discuss further (has my [employer] teams number)

## 2024-06-29 DIAGNOSIS — G43.709 CHRONIC MIGRAINE WITHOUT AURA WITHOUT STATUS MIGRAINOSUS, NOT INTRACTABLE: ICD-10-CM

## 2024-07-01 RX ORDER — TOPIRAMATE SPINKLE 25 MG/1
CAPSULE ORAL
Qty: 50 CAPSULE | Refills: 0 | Status: SHIPPED | OUTPATIENT
Start: 2024-07-01

## 2024-07-12 ENCOUNTER — OFFICE VISIT (OUTPATIENT)
Dept: FAMILY MEDICINE CLINIC | Facility: CLINIC | Age: 59
End: 2024-07-12

## 2024-07-12 VITALS
BODY MASS INDEX: 23.9 KG/M2 | TEMPERATURE: 98 F | OXYGEN SATURATION: 98 % | DIASTOLIC BLOOD PRESSURE: 78 MMHG | WEIGHT: 140 LBS | RESPIRATION RATE: 16 BRPM | SYSTOLIC BLOOD PRESSURE: 125 MMHG | HEART RATE: 89 BPM | HEIGHT: 64 IN

## 2024-07-12 DIAGNOSIS — G43.709 CHRONIC MIGRAINE WITHOUT AURA WITHOUT STATUS MIGRAINOSUS, NOT INTRACTABLE: Primary | ICD-10-CM

## 2024-07-12 DIAGNOSIS — R89.9 ABNORMAL LABORATORY TEST: ICD-10-CM

## 2024-07-12 DIAGNOSIS — N30.01 ACUTE CYSTITIS WITH HEMATURIA: ICD-10-CM

## 2024-07-12 DIAGNOSIS — R35.0 INCREASED URINARY FREQUENCY: ICD-10-CM

## 2024-07-12 LAB
SL AMB  POCT GLUCOSE, UA: 50
SL AMB LEUKOCYTE ESTERASE,UA: NORMAL
SL AMB POCT BILIRUBIN,UA: NORMAL
SL AMB POCT BLOOD,UA: 250
SL AMB POCT CLARITY,UA: CLEAR
SL AMB POCT COLOR,UA: YELLOW
SL AMB POCT KETONES,UA: NORMAL
SL AMB POCT NITRITE,UA: NORMAL
SL AMB POCT PH,UA: 6
SL AMB POCT SPECIFIC GRAVITY,UA: 1.02
SL AMB POCT URINE PROTEIN: NORMAL
SL AMB POCT UROBILINOGEN: NORMAL

## 2024-07-12 PROCEDURE — 81002 URINALYSIS NONAUTO W/O SCOPE: CPT | Performed by: INTERNAL MEDICINE

## 2024-07-12 PROCEDURE — 87086 URINE CULTURE/COLONY COUNT: CPT

## 2024-07-12 PROCEDURE — 99213 OFFICE O/P EST LOW 20 MIN: CPT | Performed by: INTERNAL MEDICINE

## 2024-07-12 RX ORDER — NITROFURANTOIN 25; 75 MG/1; MG/1
100 CAPSULE ORAL 2 TIMES DAILY
Qty: 10 CAPSULE | Refills: 0 | Status: SHIPPED | OUTPATIENT
Start: 2024-07-12 | End: 2024-07-17

## 2024-07-12 RX ORDER — METOPROLOL SUCCINATE 25 MG/1
TABLET, EXTENDED RELEASE ORAL
Qty: 60 TABLET | Refills: 1 | Status: SHIPPED | OUTPATIENT
Start: 2024-07-12

## 2024-07-12 RX ORDER — RIMEGEPANT SULFATE 75 MG/75MG
TABLET, ORALLY DISINTEGRATING ORAL
Qty: 8 TABLET | Refills: 1 | Status: SHIPPED | OUTPATIENT
Start: 2024-07-12

## 2024-07-12 NOTE — PROGRESS NOTES
Ambulatory Visit  Name: Mariana Gaitan      : 1965      MRN: [id number]  Encounter Provider: Rebecca Fay Kab-Perlman, MD  Encounter Date: 2024   Encounter department: [facility]    Assessment & Plan   1. Chronic migraine without aura without status migrainosus, not intractable  Assessment & Plan:  -pocT EKG NSR  -metoprolol can takes 12 weeks to be effective    PLAN  -will try nurtec-ODT 75mg, abortive and ppx not to exceed 75mg in 24 hour period  -discussed the possibility that insurance may not cover and if they do will probably need to complete prior auth  -meanwhile, per patient request, starting on metoprolol  Orders:  -     ECG 12 lead; Future  -     rimegepant sulfate (Nurtec) 75 mg TBDP; Take one tablet every other day for prevention and as needed for abortive therapy not to exceed 75mg over a 24 hour period.  -     metoprolol succinate (TOPROL-XL) 25 mg 24 hr tablet; Take one tablet daily for two weeks followed by two tablets daily for two weeks and onwards  2. Increased urinary frequency  Comments:  POCT dipstick positive for WBC's  Orders:  -     POCT urine dip  3. Abnormal laboratory test  -     POCT urine dip  4. Acute cystitis with hematuria  Comments:  -macrobid x5 days with counseling/education  -f/u urine cx  Orders:  -     nitrofurantoin (MACROBID) 100 mg capsule; Take 1 capsule (100 mg total) by mouth 2 (two) times a day for 5 days  -     Urine culture       History of Present Illness       Mariana Gaitan is a 57 yo female patient presenting today to f/u regarding migraines. ON a previous office visit we started on preventative topamax and ordered labs. The topamax didn't work and plan now is to perform EKG and if normal may start metoprolol prophylaxis.     Today asking about nurtec ODT.     States headaches still present. If doesn't take ibuprofen has it and it's debilitating.     Review of Systems   Constitutional:  Negative for fatigue and fever.    Eyes:  Negative for visual disturbance.   Cardiovascular:  Negative for chest pain and palpitations.   Gastrointestinal:  Negative for abdominal pain and blood in stool.   Skin:  Negative for rash.   Neurological:  Positive for headaches.     Pertinent Medical History   Chronic migraine    Medical History Reviewed by provider this encounter:       Past Medical History   No past medical history on file.  Past Surgical History:   Procedure Laterality Date    CHOLECYSTECTOMY LAPAROSCOPIC N/A 1/15/2024    Procedure: CHOLECYSTECTOMY LAPAROSCOPIC;  Surgeon: Kelby Perkins MD;  [facility];  Service: General     Family History   Problem Relation Age of Onset    Lung cancer Sister     Cancer Son     Cancer Paternal Uncle     Asthma Son      Current Outpatient Medications on File Prior to Visit   Medication Sig Dispense Refill    cetirizine (ZyrTEC) 10 mg tablet Take 1 tablet (10 mg total) by mouth daily (Patient not taking: Reported on 1/31/2024) 20 tablet 0    ibuprofen (MOTRIN) 800 mg tablet Take 1 tablet (800 mg total) by mouth every 8 (eight) hours as needed for mild pain 30 tablet 0    ibuprofen (MOTRIN) 800 mg tablet Take 1 tablet (800 mg total) by mouth every 8 (eight) hours as needed for mild pain 60 tablet 2    naproxen (NAPROSYN) 500 mg tablet Take 1 tablet (500 mg total) by mouth 2 (two) times a day with meals (Patient not taking: Reported on 1/31/2024) 20 tablet 0    ondansetron (ZOFRAN) 4 mg tablet Take 1 tablet (4 mg total) by mouth every 6 (six) hours (Patient not taking: Reported on 1/31/2024) 12 tablet 0    terbinafine (LamISIL) 1 % cream Apply topically 2 (two) times a day 42 g 0    topiramate (TOPAMAX) 25 mg sprinkle capsule USE ONE TABLET DAILY FOR WEEK 1 AND 2, THEN TWO TABLETS DAILY FOR WEEK 3 AND 4, AND TWO TABLETS THEREAFTER 50 capsule 0    triamcinolone (KENALOG) 0.1 % cream Apply topically 2 (two) times a day Apply to affected areas (Patient not taking: Reported on 1/31/2024) 30 g 0  "    No current facility-administered medications on file prior to visit.     Allergies   Allergen Reactions    Shellfish Allergy - Food Allergy Anaphylaxis    Iodine - Food Allergy Swelling      Current Outpatient Medications on File Prior to Visit   Medication Sig Dispense Refill    cetirizine (ZyrTEC) 10 mg tablet Take 1 tablet (10 mg total) by mouth daily (Patient not taking: Reported on 1/31/2024) 20 tablet 0    ibuprofen (MOTRIN) 800 mg tablet Take 1 tablet (800 mg total) by mouth every 8 (eight) hours as needed for mild pain 30 tablet 0    ibuprofen (MOTRIN) 800 mg tablet Take 1 tablet (800 mg total) by mouth every 8 (eight) hours as needed for mild pain 60 tablet 2    naproxen (NAPROSYN) 500 mg tablet Take 1 tablet (500 mg total) by mouth 2 (two) times a day with meals (Patient not taking: Reported on 1/31/2024) 20 tablet 0    ondansetron (ZOFRAN) 4 mg tablet Take 1 tablet (4 mg total) by mouth every 6 (six) hours (Patient not taking: Reported on 1/31/2024) 12 tablet 0    terbinafine (LamISIL) 1 % cream Apply topically 2 (two) times a day 42 g 0    topiramate (TOPAMAX) 25 mg sprinkle capsule USE ONE TABLET DAILY FOR WEEK 1 AND 2, THEN TWO TABLETS DAILY FOR WEEK 3 AND 4, AND TWO TABLETS THEREAFTER 50 capsule 0    triamcinolone (KENALOG) 0.1 % cream Apply topically 2 (two) times a day Apply to affected areas (Patient not taking: Reported on 1/31/2024) 30 g 0     No current facility-administered medications on file prior to visit.      Social History     Tobacco Use    Smoking status: Former    Smokeless tobacco: Never   Vaping Use    Vaping status: Never Used   Substance and Sexual Activity    Alcohol use: Never    Drug use: Never    Sexual activity: Not Currently     Comment: 12 years      Objective     /78 (BP Location: Left arm, Patient Position: Sitting, Cuff Size: Standard)   Pulse 89   Temp 98 °F (36.7 °C) (Temporal)   Resp 16   Ht 5' 4\" (1.626 m)   Wt 63.5 kg (140 lb)   SpO2 98%   " BMI 24.03 kg/m²     Physical Exam  Constitutional:       Appearance: Normal appearance. She is normal weight.   HENT:      Head: Normocephalic and atraumatic.      Nose: Nose normal.      Mouth/Throat:      Pharynx: Oropharynx is clear.   Eyes:      Conjunctiva/sclera: Conjunctivae normal.   Cardiovascular:      Rate and Rhythm: Normal rate and regular rhythm.      Pulses: Normal pulses.      Heart sounds: Normal heart sounds. No murmur heard.     No friction rub. No gallop.   Pulmonary:      Effort: Pulmonary effort is normal.      Breath sounds: Normal breath sounds. No wheezing, rhonchi or rales.   Abdominal:      General: Abdomen is flat. Bowel sounds are normal.      Palpations: Abdomen is soft.   Musculoskeletal:         General: Normal range of motion.      Cervical back: Normal range of motion.   Skin:     General: Skin is warm.      Capillary Refill: Capillary refill takes less than 2 seconds.   Neurological:      Mental Status: She is alert.       Administrative Statements   I have spent a total time of 30 minutes in caring for this patient on the day of the visit/encounter including Prognosis, Risks and benefits of tx options, Instructions for management, Patient and family education, Importance of tx compliance, Risk factor reductions, Impressions, Counseling / Coordination of care, Documenting in the medical record, Reviewing / ordering tests, medicine, procedures  , Obtaining or reviewing history  , and Communicating with other healthcare professionals .

## 2024-07-13 LAB — BACTERIA UR CULT: NORMAL

## 2024-07-15 NOTE — ASSESSMENT & PLAN NOTE
-pocT EKG NSR  -metoprolol can takes 12 weeks to be effective    PLAN  -will try nurtec-ODT 75mg, abortive and ppx not to exceed 75mg in 24 hour period  -discussed the possibility that insurance may not cover and if they do will probably need to complete prior auth  -meanwhile, per patient request, starting on metoprolol

## 2024-08-21 PROBLEM — Z00.00 HEALTHCARE MAINTENANCE: Status: ACTIVE | Noted: 2024-08-21

## 2024-08-22 ENCOUNTER — OFFICE VISIT (OUTPATIENT)
Dept: FAMILY MEDICINE CLINIC | Facility: CLINIC | Age: 59
End: 2024-08-22

## 2024-08-22 VITALS
TEMPERATURE: 98 F | OXYGEN SATURATION: 98 % | BODY MASS INDEX: 24.75 KG/M2 | HEIGHT: 64 IN | DIASTOLIC BLOOD PRESSURE: 90 MMHG | HEART RATE: 64 BPM | RESPIRATION RATE: 16 BRPM | WEIGHT: 145 LBS | SYSTOLIC BLOOD PRESSURE: 165 MMHG

## 2024-08-22 DIAGNOSIS — Z12.12 SCREENING FOR COLORECTAL CANCER: ICD-10-CM

## 2024-08-22 DIAGNOSIS — R03.0 BLOOD PRESSURE ELEVATED WITHOUT HISTORY OF HTN: ICD-10-CM

## 2024-08-22 DIAGNOSIS — Z11.4 SCREENING FOR HIV (HUMAN IMMUNODEFICIENCY VIRUS): ICD-10-CM

## 2024-08-22 DIAGNOSIS — F32.A ANXIETY AND DEPRESSION: ICD-10-CM

## 2024-08-22 DIAGNOSIS — Z00.00 ANNUAL PHYSICAL EXAM: Primary | ICD-10-CM

## 2024-08-22 DIAGNOSIS — F41.9 ANXIETY AND DEPRESSION: ICD-10-CM

## 2024-08-22 DIAGNOSIS — Z12.31 SCREENING MAMMOGRAM FOR BREAST CANCER: ICD-10-CM

## 2024-08-22 DIAGNOSIS — Z00.00 HEALTHCARE MAINTENANCE: ICD-10-CM

## 2024-08-22 DIAGNOSIS — Z12.11 SCREENING FOR COLORECTAL CANCER: ICD-10-CM

## 2024-08-22 DIAGNOSIS — Z11.59 NEED FOR HEPATITIS C SCREENING TEST: ICD-10-CM

## 2024-08-22 DIAGNOSIS — G43.709 CHRONIC MIGRAINE WITHOUT AURA WITHOUT STATUS MIGRAINOSUS, NOT INTRACTABLE: ICD-10-CM

## 2024-08-22 PROCEDURE — 99396 PREV VISIT EST AGE 40-64: CPT | Performed by: INTERNAL MEDICINE

## 2024-08-22 PROCEDURE — 99213 OFFICE O/P EST LOW 20 MIN: CPT | Performed by: INTERNAL MEDICINE

## 2024-08-22 RX ORDER — BLOOD PRESSURE TEST KIT
KIT MISCELLANEOUS DAILY
Qty: 1 KIT | Refills: 0 | Status: SHIPPED | OUTPATIENT
Start: 2024-08-22

## 2024-08-22 RX ORDER — ESCITALOPRAM OXALATE 10 MG/1
10 TABLET ORAL DAILY
Qty: 30 TABLET | Refills: 5 | Status: SHIPPED | OUTPATIENT
Start: 2024-08-22 | End: 2025-02-18

## 2024-08-22 RX ORDER — LANOLIN ALCOHOL/MO/W.PET/CERES
3 CREAM (GRAM) TOPICAL
Qty: 60 TABLET | Refills: 1 | Status: SHIPPED | OUTPATIENT
Start: 2024-08-22

## 2024-08-22 RX ORDER — HYDROXYZINE HYDROCHLORIDE 25 MG/1
25 TABLET, FILM COATED ORAL EVERY 6 HOURS PRN
Qty: 60 TABLET | Refills: 1 | Status: SHIPPED | OUTPATIENT
Start: 2024-08-22

## 2024-08-22 RX ORDER — RIMEGEPANT SULFATE 75 MG/75MG
TABLET, ORALLY DISINTEGRATING ORAL
Qty: 60 TABLET | Refills: 0 | Status: SHIPPED | OUTPATIENT
Start: 2024-08-22

## 2024-08-22 NOTE — ASSESSMENT & PLAN NOTE
-On last office visit prescribed with Nurtec ODT 75 mg with counseling; did not  states pharmacy never gave it to her    PLAN  -re-prescribed Nurtec ODT and explained to speak with pharmacist and specifically bring it up  -gave patient number and she called several days later stating it's not covered and requested ibuprofen which was subsequently prescribed

## 2024-08-22 NOTE — PATIENT INSTRUCTIONS
"Patient Education     Routine physical for adults   The Basics   Written by the doctors and editors at [facility]   What is a physical? -- A physical is a routine visit, or \"check-up,\" with your doctor. You might also hear it called a \"wellness visit\" or \"preventive visit.\"  During each visit, the doctor will:   Ask about your physical and mental health   Ask about your habits, behaviors, and lifestyle   Do an exam   Give you vaccines if needed   Talk to you about any medicines you take   Give advice about your health   Answer your questions  Getting regular check-ups is an important part of taking care of your health. It can help your doctor find and treat any problems you have. But it's also important for preventing health problems.  A routine physical is different from a \"sick visit.\" A sick visit is when you see a doctor because of a health concern or problem. Since physicals are scheduled ahead of time, you can think about what you want to ask the doctor.  How often should I get a physical? -- It depends on your age and health. In general, for people age 21 years and older:   If you are younger than 50 years, you might be able to get a physical every 3 years.   If you are 50 years or older, your doctor might recommend a physical every year.  If you have an ongoing health condition, like diabetes or high blood pressure, your doctor will probably want to see you more often.  What happens during a physical? -- In general, each visit will include:   Physical exam - The doctor or nurse will check your height, weight, heart rate, and blood pressure. They will also look at your eyes and ears. They will ask about how you are feeling and whether you have any symptoms that bother you.   Medicines - It's a good idea to bring a list of all the medicines you take to each doctor visit. Your doctor will talk to you about your medicines and answer any questions. Tell them if you are having any side effects that bother you. You " "should also tell them if you are having trouble paying for any of your medicines.   Habits and behaviors - This includes:   Your diet   Your exercise habits   Whether you smoke, drink alcohol, or use drugs   Whether you are sexually active   Whether you feel safe at home  Your doctor will talk to you about things you can do to improve your health and lower your risk of health problems. They will also offer help and support. For example, if you want to quit smoking, they can give you advice and might prescribe medicines. If you want to improve your diet or get more physical activity, they can help you with this, too.   Lab tests, if needed - The tests you get will depend on your age and situation. For example, your doctor might want to check your:   Cholesterol   Blood sugar   Iron level   Vaccines - The recommended vaccines will depend on your age, health, and what vaccines you already had. Vaccines are very important because they can prevent certain serious or deadly infections.   Discussion of screening - \"Screening\" means checking for diseases or other health problems before they cause symptoms. Your doctor can recommend screening based on your age, risk, and preferences. This might include tests to check for:   Cancer, such as breast, prostate, cervical, ovarian, colorectal, prostate, lung, or skin cancer   Sexually transmitted infections, such as chlamydia and gonorrhea   Mental health conditions like depression and anxiety  Your doctor will talk to you about the different types of screening tests. They can help you decide which screenings to have. They can also explain what the results might mean.   Answering questions - The physical is a good time to ask the doctor or nurse questions about your health. If needed, they can refer you to other doctors or specialists, too.  Adults older than 65 years often need other care, too. As you get older, your doctor will talk to you about:   How to prevent falling at " home   Hearing or vision tests   Memory testing   How to take your medicines safely   Making sure that you have the help and support you need at home  All topics are updated as new evidence becomes available and our peer review process is complete.  This topic retrieved from [employer] on: May 02, 2024.  Topic 555864 Version 1.0  Release: 32.4.3 - C32.122  © 2024 UpToDate, Inc. and/or its affiliates. All rights reserved.  Consumer Information Use and Disclaimer   Disclaimer: This generalized information is a limited summary of diagnosis, treatment, and/or medication information. It is not meant to be comprehensive and should be used as a tool to help the user understand and/or assess potential diagnostic and treatment options. It does NOT include all information about conditions, treatments, medications, side effects, or risks that may apply to a specific patient. It is not intended to be medical advice or a substitute for the medical advice, diagnosis, or treatment of a health care provider based on the health care provider's examination and assessment of a patient's specific and unique circumstances. Patients must speak with a health care provider for complete information about their health, medical questions, and treatment options, including any risks or benefits regarding use of medications. This information does not endorse any treatments or medications as safe, effective, or approved for treating a specific patient. UpToDate, Inc. and its affiliates disclaim any warranty or liability relating to this information or the use thereof.The use of this information is governed by the Terms of Use, available at https://www.woltersCoNarrativeuwer.com/en/know/clinical-effectiveness-terms. 2024© UpToDate, Inc. and its affiliates and/or licensors. All rights reserved.  Copyright   © 2024 UpToDate, Inc. and/or its affiliates. All rights reserved.    If you would like to be added to the wait list for services within [facility]  [facility], you will need to contact them directly at [facility] Outpatient Therapy and Psychiatry - [phone]    Outpatient Mental Health Resources     [place] Suicide Prevention Lifeline: Dial #629  If you prefer to text, you can reach the Crisis Text Line by texting “[place]” to 760390    ¿Te encuentras en crisis? Envía un mensaje de texto con la palabra AYUDA al [phone] para comunicarte de manera gratuita con un Consejero de Crisis  Apoyo gratuito las 24 horas del día, los 7 días de la semana, al alcance de tu mano.    [facility] CRISIS for mental health emergencies and/or please go to your local Emergency Department Call [phone] if you or a loved one are in a mental health crisis.  You can Visit https://www.[facility].[place].gov/Services/Mental-Health-In-[place]/Documents/Suicide_Prevention_Hotlines.pdf to find 24/7 crisis phone line for other [facility].    [facility] Mental Health  If you have NO insurance for outpatient Mental Health services you will need to have a liability appointment with [facility] to assess you to qualify for funding. [facility] does NOT provide funding for Medications.  Please call [phone] or [phone] to schedule an intake assessment    [facility]   ? Location 1: [address] [place] [address] [phone]   ? Location 2: [address] [place] [id number] [phone]        ? English only* Serves ages 4+          ? Accepts Medicare and some commercial plans    [facility]   ? [address] [place] [address] [phone]; [phone]  ? Bilingual English/Montenegrin Serves ages 5+   ? Accepts Medical Assistance     [facility]   ? [address] [place] [address] [phone]   ? Bilingual English/Montenegrin Serves ages 14+   ? Accepts Medical Assistance, (Not currently accepting Medicare), & Major Commercial Insurances     [facility]   ? [address] [place] [address] [phone]        ? Bilingual English/Montenegrin Serves ages 6+   ?  Accepts Medical Assistance & Commercial Insurance     [facility]   ? Previous [address] location is closed  ? [address] [facility], [address] [phone]   ? Bilingual English/Brazilian Serves ages 3+   ? Accepts Medical Assistance & Some Commercial Insurance     [facility]   ? [address] [facility] [facility] [address] [place] [address] [phone]   ? Bilingual English/Brazilian Serves ages 5+   ? Accepts Medical Assistance     [facility] FAMILY ANSWERS   ? [address] [place] [address] [phone]  ? Bilingual English/Brazilian Serves ages 3+   ? Accepts Medical Assistance & Some Commercial Insurance     PREVENT[facility]ASURES   ? Location 1: [address] [place] [address] [phone]        ? Location 2: [address] [place] [address]   ? Bilingual English/Brazilian Serves ages 18+  ? Accepts Medical Assistance    [facility], [facility]  ? [address], [place] [address] [phone]  ? Bilingual English/Brazilian  ? Accepts Aetna, Cigna, Optum/[facility], [facility], [employer] Blue [employer], Medicare, [employer]/[facility], [facility]. No Medical Assistance    [facility] ([phone])  Medicare/Medicare Advantage, Medical Assistance/HealthChoices, Commercial, and self-pay as payment.    [facility]         ? [address], at [facility], [place] [address]; [phone]         ? Bilingual English/Brazilian Serves ages 6+         ? Accepts Medical Assistance     TEEN HELP LINE  ? 0-617-845-TALK    CRIME VICTIMS [place]       ? [phone]       ? 24/7 [facility] Hotline    [facility]       ? [phone]       ? 24/7 [facility] Hotline    www.psychologytoday.com is a resource to find psychotherapy providers, patients can filter therapist search list based on a number of criteria including language, specialty, gender, insurance, etc. Individuals seeking will need to reach out to perspective providers through information in  the directory. You are encouraged to contact multiple providers to given that many providers have a significant wait list for services as well as to find a provider is a good fit for you!    [facility] is an organization of families, friends and individuals whose lives have been affected by mental illness. Together, we advocate for better lives for those individuals who have a m)ental illness. Visit: [facility].org to learn more or to search for local support resources including qualified mental health providers.  ^^^^^^^^^^^^^^^^^^^^^^^^^^^  Your safety and wellbeing as well as that of those around you is important to us. Should you or someone you know be in need, here are some area resources that may help:    Do you feel like you might be in crisis and potentially need professional services immediately?     [place] Suicide Prevention Lifeline: Dial #135  If you prefer to text, you can reach the Crisis Text Line by texting “[place]” to 328083    ¿Te encuentras en crisis? Envía un mensaje de texto con la palabra AYUDA al [phone] para comunicarte de manera gratuita con un Consejero de Crisis. Apoyo gratuito las 24 horas del día, los 7 días de la semana, al alcance de tu mano.    The [employer] Project   The [employer] is the leading suicide prevention and crisis intervention nonprofit organization for LGBTQ young people. We provide information & support to LGBTQ ?young people 24/7, all year round.   Call Us: 1-140.434.2630 or Text Us: [phone]    Alternatively, you can Visit https://www.[facility]hs.[place].gov/Services/Mental-Health-In-[place]/Documents/Suicide_Prevention_Hotlines.pdf to find your Critical access hospital's 24/7 crisis phone line.    Are you feeling overwhelmed, want to speak with a supportive person (NOT for crisis), and/or are you looking for additional resources?     [facility] Answers Warmline: Call [phone].  This Warmline provides telephone service for Adult residents (18 years and older) of [facility] who need  emotional support, help with problem solving, or information and referral. (Hours 7 Days a week 6:00 AM and 2:00 AM)    [facility]: 24/7 Helpline: [phone] or Toll-free: [phone] TTY: [phone] or online: e-SENSorg/our-services/  [facility] is a safe place where ALL survivors of domestic and intimate partner abuse and their children can find refuge. [facility] offers confidential resources to individuals and families including supportive mental health services and connection to resources. We provide services in [facility].    [place] Coalition Against Domestic Violence: Dial 1-507.967.6124 or Visit https://www.pcadv.org  Among the services provided to domestic violence victims are: crisis intervention; counseling; accompaniment to police, medical, and court facilities; and temporary emergency shelter for victims and their dependent children. Prevention and educational programs are provided to lessen the risk of domestic violence in the community at large.    Find a local Alcohol Anonymous meeting: Dial 1-262.265.9830 or Visit https://www.aaPublic Media Works.imo.im/pn-deaobxx-gvqtipay/[place]/  Find a local Narcotics Anonymous meeting: Visit https://www.na.org/meetingsearch/     Loogares.Com is a website where you can look for accessible care and many other services including financial assistance, food pantries, medical care, and other free or reduced-cost resources in your area, even if you don't have insurance. Visit: https://Penthera PartnerscomRedHelperityhealth.Nativis.imo.im/    Career link is your resource for all things job and employment related. [facility] at [place], [address], [place] [address]  Walk in or call Monday through Friday 8:00AM - 4:30 PM: [phone]/TTY: [phone]  www.careerlinkle[facility].org    If you need to connect with resources in your community, but don't know where to look,  is a great place to start. From help  with a utilities bill, to housing assistance, after-school programs for kids, and more, you can dial 211 or text your zip code to [phone] to talk with a  for free.    [facility]:  This resource assists individuals in need of connection to healthcare services of all types including primary care, substance dependence and acute psychiatric care on a limited basis.  Team Phone: [phone] or [phone]    [facility] Information & Referral Office is the entry point for [facility] Human Services departments and information about community resources. Professional Caseworkers will work with you to determination whether a referral requires further assessment and may either refer you to a community agency, or, complete a formal referral to a Human Services specialized office (Aging and Adult Services, Children and Youth, Mental Health, Developmental Programs or Early Intervention).     [facility], [address] [place] [address]  Dial: [phone]  Walk-in hours are 8:30AM to 4:15PM, Monday through Friday-- no appointment is needed.    If you feel at risk of immediate harm to yourself or another, call 9-1-1 or go directly to the Emergency Department.  ^^^^^^^^^^^^^^^^^^^^^^^^^^^^^^

## 2024-08-22 NOTE — ASSESSMENT & PLAN NOTE
Colonoscopy: never had before; opted into cologard and provided counseling  Mammogram: never had mammogram ordered today   Pap smear: never needs to schedule   DEXA: No prior history of fractures, not on long-term steroids, not indicated until age 65

## 2024-08-22 NOTE — ASSESSMENT & PLAN NOTE
-elevated bp today in office visit may be related to not sleeping and headaches  -checked twice at work and highest 137    PLAN  -blood pressure kit ordered today   -keep bp log to bring at f/u appointment  -may be contributing to headaches  -nurse visit in 2 weeks for bp check

## 2024-08-22 NOTE — PROGRESS NOTES
Adult Annual Physical  Name: Mariana Gaitan      : 1965      MRN: [id number]  Encounter Provider: Rebecca Fay Kab-Perlman, MD  Encounter Date: 2024   Encounter department: [facility]    Assessment & Plan   1. Annual physical exam  2. Healthcare maintenance  Assessment & Plan:  Colonoscopy: never had before; opted into cologard and provided counseling  Mammogram: never had mammogram ordered today   Pap smear: never needs to schedule   DEXA: No prior history of fractures, not on long-term steroids, not indicated until age 65  3. Blood pressure elevated without history of HTN  Assessment & Plan:  -elevated bp today in office visit may be related to not sleeping and headaches  -checked twice at work and highest 137    PLAN  -blood pressure kit ordered today   -keep bp log to bring at f/u appointment  -may be contributing to headaches  -nurse visit in 2 weeks for bp check  Orders:  -     Blood Pressure KIT; Use in the morning  4. Chronic migraine without aura without status migrainosus, not intractable  Assessment & Plan:  -On last office visit prescribed with Nurtec ODT 75 mg with counseling; did not  states pharmacy never gave it to her    PLAN  -re-prescribed Nurtec ODT and explained to speak with pharmacist and specifically bring it up  -gave patient number and she called several days later stating it's not covered and requested ibuprofen which was subsequently prescribed  Orders:  -     rimegepant sulfate (Nurtec) 75 mg TBDP; Take one tablet every other day for prevention and as needed for abortive therapy not to exceed 75mg over a 24 hour period.  -     ibuprofen (MOTRIN) 600 mg tablet; Take one tablet every 6 hours as needed for headache. Do not take at same time as escitalopram by several hours  5. Anxiety and depression  Assessment & Plan:  -today PHQ-9 of 13 and IVÁN 7 of 17 indicating moderate depression and severe anxiety  -sleep routine: sleeps 3/4 hours  a night, lays in bed and watches TV restless  -primary breadwinner which causes stress  -significantly affecting her life right now and making it difficult for her to function    PLAN  -lexapro 10mg ad atarax 25mg with education/counseling   -melatonin 3mg to help with sleep  -sleep hygiene counseling/education provided today   -f/u 6 weeks  Orders:  -     escitalopram (LEXAPRO) 10 mg tablet; Take 1 tablet (10 mg total) by mouth daily  -     hydrOXYzine HCL (ATARAX) 25 mg tablet; Take 1 tablet (25 mg total) by mouth every 6 (six) hours as needed for itching  -     melatonin 3 mg; Take 1 tablet (3 mg total) by mouth daily at bedtime  6. Screening for HIV (human immunodeficiency virus)  -     HIV 1/2 AG/AB w Reflex [facility] for 2 yr old and above; Future  7. Need for hepatitis C screening test  -     Hepatitis C antibody; Future  8. Screening mammogram for breast cancer  -     Mammo screening bilateral w 3d & cad; Future  9. Screening for colorectal cancer  -     Cologuard    Immunizations and preventive care screenings were discussed with patient today. Appropriate education was printed on patient's after visit summary.    Counseling:  Alcohol/drug use: discussed moderation in alcohol intake, the recommendations for healthy alcohol use, and avoidance of illicit drug use.  Dental Health: discussed importance of regular tooth brushing, flossing, and dental visits.  Injury prevention: discussed safety/seat belts, safety helmets, smoke detectors, carbon dioxide detectors, and smoking near bedding or upholstery.  Sexual health: discussed sexually transmitted diseases, partner selection, use of condoms, avoidance of unintended pregnancy, and contraceptive alternatives.  Exercise: the importance of regular exercise/physical activity was discussed. Recommend exercise 3-5 times per week for at least 30 minutes.          History of Present Illness     Adult Annual Physical:  Patient presents for annual physical.     Diet and  Physical Activity:  - Diet/Nutrition: poor diet, limited junk food and adequate fiber intake.  - Exercise: walking.    Depression Screening:    - PHQ-9 Score: 13    General Health:  - Sleep: 1-3 hours of sleep on average and unrefreshing sleep.  - Hearing: normal hearing bilateral ears.  - Vision: wears glasses and most recent eye exam > 1 year ago. having trouble finding someone close who accepts insurance  - Dental: brushes teeth once daily and regular dental visits. flosses    /GYN Health:  - Follows with GYN: no.   - Menopause: postmenopausal.   - Last menstrual cycle: 8/1/2012.   - History of STDs: no  - Contraception:. no active      Advanced Care Planning:  - Has an advanced directive?: no    - Has a durable medical POA?: no    - ACP document given to patient?: yes      Review of Systems   Constitutional:  Positive for fatigue. Negative for fever.   Respiratory:  Negative for shortness of breath.    Cardiovascular:  Negative for chest pain, palpitations and leg swelling.   Gastrointestinal:  Negative for abdominal pain.   Neurological:  Positive for headaches. Negative for dizziness, seizures, syncope, speech difficulty, weakness, light-headedness and numbness.   Psychiatric/Behavioral:  Negative for hallucinations and suicidal ideas. The patient is nervous/anxious.      Pertinent Medical History   Chronic migraine    Medical History Reviewed by provider this encounter:           Medical History Reviewed by provider this encounter:       Past Medical History   No past medical history on file.  Past Surgical History:   Procedure Laterality Date    CHOLECYSTECTOMY LAPAROSCOPIC N/A 1/15/2024    Procedure: CHOLECYSTECTOMY LAPAROSCOPIC;  Surgeon: Kelby Perkins MD;  Location: [facility] OR;  Service: General     Family History   Problem Relation Age of Onset    Lung cancer Sister     Cancer Son     Cancer Paternal Uncle     Asthma Son      Current Outpatient Medications on File Prior to Visit   Medication Sig  "Dispense Refill    metoprolol succinate (TOPROL-XL) 25 mg 24 hr tablet Take one tablet daily for two weeks followed by two tablets daily for two weeks and onwards 60 tablet 1     No current facility-administered medications on file prior to visit.     Allergies   Allergen Reactions    Shellfish Allergy - Food Allergy Anaphylaxis    Iodine - Food Allergy Swelling      Current Outpatient Medications on File Prior to Visit   Medication Sig Dispense Refill    metoprolol succinate (TOPROL-XL) 25 mg 24 hr tablet Take one tablet daily for two weeks followed by two tablets daily for two weeks and onwards 60 tablet 1     No current facility-administered medications on file prior to visit.      Social History     Tobacco Use    Smoking status: Former    Smokeless tobacco: Never   Vaping Use    Vaping status: Never Used   Substance and Sexual Activity    Alcohol use: Never    Drug use: Never    Sexual activity: Not Currently     Comment: 12 years        Objective     /90 (BP Location: Left arm, Patient Position: Sitting, Cuff Size: Adult)   Pulse 64   Temp 98 °F (36.7 °C) (Temporal)   Resp 16   Ht 5' 4\" (1.626 m)   Wt 65.8 kg (145 lb)   SpO2 98%   BMI 24.89 kg/m²     Physical Exam  Constitutional:       Appearance: Normal appearance. She is normal weight.   HENT:      Head: Normocephalic and atraumatic.      Right Ear: Tympanic membrane, ear canal and external ear normal.      Left Ear: Tympanic membrane, ear canal and external ear normal.      Nose: Nose normal.      Mouth/Throat:      Mouth: Mucous membranes are moist.      Pharynx: Oropharynx is clear.   Eyes:      Extraocular Movements: Extraocular movements intact.      Conjunctiva/sclera: Conjunctivae normal.      Pupils: Pupils are equal, round, and reactive to light.   Cardiovascular:      Rate and Rhythm: Normal rate and regular rhythm.      Pulses: Normal pulses.      Heart sounds: Normal heart sounds. No murmur heard.     No friction rub. No " gallop.   Pulmonary:      Effort: Pulmonary effort is normal.      Breath sounds: Normal breath sounds. No wheezing, rhonchi or rales.   Abdominal:      General: Abdomen is flat. Bowel sounds are normal. There is no distension.      Palpations: Abdomen is soft. There is no mass.      Tenderness: There is no abdominal tenderness.      Hernia: No hernia is present.   Musculoskeletal:         General: No swelling or deformity. Normal range of motion.      Cervical back: Normal range of motion and neck supple. No tenderness.      Comments: Muscle strength 5+ upper and lower extremities   Lymphadenopathy:      Cervical: No cervical adenopathy.   Skin:     General: Skin is warm.      Capillary Refill: Capillary refill takes less than 2 seconds.   Neurological:      General: No focal deficit present.      Mental Status: She is alert and oriented to person, place, and time.      Motor: No weakness.      Gait: Gait normal.   Psychiatric:         Mood and Affect: Mood normal.         Behavior: Behavior normal.         Thought Content: Thought content normal.         Judgment: Judgment normal.

## 2024-08-23 RX ORDER — IBUPROFEN 600 MG/1
TABLET, FILM COATED ORAL
Qty: 30 TABLET | Refills: 0 | Status: SHIPPED | OUTPATIENT
Start: 2024-08-23

## 2024-08-25 PROBLEM — F32.A ANXIETY AND DEPRESSION: Status: ACTIVE | Noted: 2024-08-25

## 2024-08-25 PROBLEM — F41.9 ANXIETY AND DEPRESSION: Status: ACTIVE | Noted: 2024-06-10

## 2024-08-25 PROBLEM — F32.A ANXIETY AND DEPRESSION: Status: ACTIVE | Noted: 2024-06-10

## 2024-08-25 PROBLEM — F41.9 ANXIETY AND DEPRESSION: Status: ACTIVE | Noted: 2024-08-25

## 2024-08-26 NOTE — ASSESSMENT & PLAN NOTE
-today PHQ-9 of 13 and IVÁN 7 of 17 indicating moderate depression and severe anxiety  -sleep routine: sleeps 3/4 hours a night, lays in bed and watches TV restless  -primary breadwinner which causes stress  -significantly affecting her life right now and making it difficult for her to function    PLAN  -lexapro 10mg ad atarax 25mg with education/counseling   -melatonin 3mg to help with sleep  -sleep hygiene counseling/education provided today   -f/u 6 weeks

## 2024-09-05 ENCOUNTER — TELEPHONE (OUTPATIENT)
Dept: OTHER | Facility: HOSPITAL | Age: 59
End: 2024-09-05

## 2024-09-05 DIAGNOSIS — G43.709 CHRONIC MIGRAINE WITHOUT AURA WITHOUT STATUS MIGRAINOSUS, NOT INTRACTABLE: ICD-10-CM

## 2024-09-05 RX ORDER — IBUPROFEN 600 MG/1
TABLET, FILM COATED ORAL
Qty: 30 TABLET | Refills: 2 | Status: SHIPPED | OUTPATIENT
Start: 2024-09-05

## 2024-09-05 NOTE — TELEPHONE ENCOUNTER
Patient called my [employer] number to request refill of ibuprofen today. Refills sent. She will make an appointment with me so we can discuss other options to manage her migraines. I stressed long term NSAID not ideal solution due to effects on stomach and kidneys. She states she is using 600mg twice daily with food. Recommended drinking plenty of water.     She may benefit from amitryptyline for both depression and migraines. We have tried topamax and metoprolol and neither is effective. She bought nurtec and it cost her $20, unable to afford long term. Will discuss further options at future visit.

## 2024-09-12 LAB — COLOGUARD RESULT REPORTABLE: NEGATIVE

## 2024-09-20 PROBLEM — Z00.00 HEALTHCARE MAINTENANCE: Status: RESOLVED | Noted: 2024-08-21 | Resolved: 2024-09-20

## 2024-10-04 ENCOUNTER — OFFICE VISIT (OUTPATIENT)
Dept: FAMILY MEDICINE CLINIC | Facility: CLINIC | Age: 59
End: 2024-10-04

## 2024-10-04 VITALS
WEIGHT: 141.6 LBS | SYSTOLIC BLOOD PRESSURE: 130 MMHG | HEART RATE: 96 BPM | RESPIRATION RATE: 16 BRPM | TEMPERATURE: 96.3 F | OXYGEN SATURATION: 98 % | BODY MASS INDEX: 24.17 KG/M2 | DIASTOLIC BLOOD PRESSURE: 88 MMHG | HEIGHT: 64 IN

## 2024-10-04 DIAGNOSIS — G43.709 CHRONIC MIGRAINE WITHOUT AURA WITHOUT STATUS MIGRAINOSUS, NOT INTRACTABLE: Primary | ICD-10-CM

## 2024-10-04 PROCEDURE — 99213 OFFICE O/P EST LOW 20 MIN: CPT | Performed by: FAMILY MEDICINE

## 2024-10-04 RX ORDER — TOPIRAMATE 25 MG/1
25 TABLET, FILM COATED ORAL 2 TIMES DAILY
Qty: 60 TABLET | Refills: 3 | Status: SHIPPED | OUTPATIENT
Start: 2024-10-04

## 2024-10-04 NOTE — ASSESSMENT & PLAN NOTE
-previously tried topiramate and stopped in less than a few weeks unable to remember why  -at this point in time unclear if stopped because symptoms secondary to adjusting to medication versus other side effects; after discussion with patient willing to try again and remain consistent until can reach steady state  -She does state that she had headaches before age 51 however they would occur maybe once monthly and it has gotten progressively worse in the last year  -In addition between ages of 8, 9, and 10 she had fainting spells that resolved on their own    PLAN  -Topiramate ordered today will continue for one month  and follow-up then  -If continues to have headaches despite topiramate will consider head CT to rule out other causes such as pseudotumor cerebri and mass     Orders:    topiramate (Topamax) 25 mg tablet; Take 1 tablet (25 mg total) by mouth 2 (two) times a day 5mg for first two weeks followed by 50mg thereafter

## 2024-10-04 NOTE — PROGRESS NOTES
Ambulatory Visit  Name: Mariana Gaitan      : 1965      MRN: [id number]  Encounter Provider: Rebecca Fay Kab-Perlman, MD  Encounter Date: 10/4/2024   Encounter department: [facility]    Assessment & Plan  Chronic migraine without aura without status migrainosus, not intractable  -previously tried topiramate and stopped in less than a few weeks unable to remember why  -at this point in time unclear if stopped because symptoms secondary to adjusting to medication versus other side effects; after discussion with patient willing to try again and remain consistent until can reach steady state  -She does state that she had headaches before age 51 however they would occur maybe once monthly and it has gotten progressively worse in the last year  -In addition between ages of 8, 9, and 10 she had fainting spells that resolved on their own    PLAN  -Topiramate ordered today will continue for one month  and follow-up then  -If continues to have headaches despite topiramate will consider head CT to rule out other causes such as pseudotumor cerebri and mass     Orders:    topiramate (Topamax) 25 mg tablet; Take 1 tablet (25 mg total) by mouth 2 (two) times a day 5mg for first two weeks followed by 50mg thereafter       History of Present Illness       Mariana Gaitan is a 59 yo female patient presenting today to f/u regarding her migraine headaches.   Had headaches in her 40's but not as a teenager. In her 40's it would come maybe once monthly. When she was about 8, 9, 10 years old would faint frequently and periods of increased excitement.  It would occur about once or twice every week and eventually she grew out of it.    She states she went to the ophthalmologist to get her eyes checked at [facility] and they didn't accept her insurance. I called [facility] to find out if they accept her insurance [facility] and they do not. It is through medicaid therefore I recommended that she call  and find out what insurances are excepted and change her insurance as she has options on Medicaid.  The other option would be to call the vision member services and  obtain an emailed list of businesses that accept her insurance.    Important to note, she checks her blood pressure multiple times a day and it is normal 130's throughout the day but then becomes elevated after she gets her headaches into the 170's.    History obtained from : patient  Review of Systems   Constitutional:  Negative for fever.   Eyes:  Negative for visual disturbance.   Cardiovascular:  Negative for chest pain and palpitations.   Skin:  Negative for rash.   Neurological:  Positive for headaches.     Pertinent Medical History     Chronic migraine    Medical History Reviewed by provider this encounter:           Medical History Reviewed by provider this encounter:       Past Medical History   History reviewed. No pertinent past medical history.  Past Surgical History:   Procedure Laterality Date    CHOLECYSTECTOMY LAPAROSCOPIC N/A 1/15/2024    Procedure: CHOLECYSTECTOMY LAPAROSCOPIC;  Surgeon: Kelby Perkins MD;  [facility]: [facility];  Service: General     Family History   Problem Relation Age of Onset    Lung cancer Sister     Cancer Son     Cancer Paternal Uncle     Asthma Son      Current Outpatient Medications on File Prior to Visit   Medication Sig Dispense Refill    Blood Pressure KIT Use in the morning 1 kit 0    escitalopram (LEXAPRO) 10 mg tablet Take 1 tablet (10 mg total) by mouth daily 30 tablet 5    hydrOXYzine HCL (ATARAX) 25 mg tablet Take 1 tablet (25 mg total) by mouth every 6 (six) hours as needed for itching 60 tablet 1    ibuprofen (MOTRIN) 600 mg tablet Take one tablet every 6 hours as needed for headache. Do not take at same time as escitalopram by several hours 30 tablet 2    melatonin 3 mg Take 1 tablet (3 mg total) by mouth daily at bedtime 60 tablet 1    rimegepant sulfate (Nurtec) 75 mg TBDP Take one tablet every  other day for prevention and as needed for abortive therapy not to exceed 75mg over a 24 hour period. 60 tablet 0    [DISCONTINUED] metoprolol succinate (TOPROL-XL) 25 mg 24 hr tablet Take one tablet daily for two weeks followed by two tablets daily for two weeks and onwards 60 tablet 1     No current facility-administered medications on file prior to visit.     Allergies   Allergen Reactions    Shellfish Allergy - Food Allergy Anaphylaxis    Iodine - Food Allergy Swelling      Current Outpatient Medications on File Prior to Visit   Medication Sig Dispense Refill    Blood Pressure KIT Use in the morning 1 kit 0    escitalopram (LEXAPRO) 10 mg tablet Take 1 tablet (10 mg total) by mouth daily 30 tablet 5    hydrOXYzine HCL (ATARAX) 25 mg tablet Take 1 tablet (25 mg total) by mouth every 6 (six) hours as needed for itching 60 tablet 1    ibuprofen (MOTRIN) 600 mg tablet Take one tablet every 6 hours as needed for headache. Do not take at same time as escitalopram by several hours 30 tablet 2    melatonin 3 mg Take 1 tablet (3 mg total) by mouth daily at bedtime 60 tablet 1    rimegepant sulfate (Nurtec) 75 mg TBDP Take one tablet every other day for prevention and as needed for abortive therapy not to exceed 75mg over a 24 hour period. 60 tablet 0    [DISCONTINUED] metoprolol succinate (TOPROL-XL) 25 mg 24 hr tablet Take one tablet daily for two weeks followed by two tablets daily for two weeks and onwards 60 tablet 1     No current facility-administered medications on file prior to visit.      Social History     Tobacco Use    Smoking status: Former    Smokeless tobacco: Never   Vaping Use    Vaping status: Never Used   Substance and Sexual Activity    Alcohol use: Never    Drug use: Never    Sexual activity: Not Currently     Comment: 12 years          Objective     /88 (BP Location: Right arm, Patient Position: Sitting, Cuff Size: Standard)   Pulse 96   Temp (!) 96.3 °F (35.7 °C) (Temporal)   Resp  "16   Ht 5' 4\" (1.626 m)   Wt 64.2 kg (141 lb 9.6 oz)   SpO2 98%   BMI 24.31 kg/m²     Physical Exam  Constitutional:       Appearance: Normal appearance. She is normal weight.   HENT:      Head: Normocephalic and atraumatic.      Comments: Tenderness to palpation of temporalis trigger point bilaterally     Right Ear: External ear normal.      Left Ear: External ear normal.      Nose: Nose normal.   Eyes:      Conjunctiva/sclera: Conjunctivae normal.   Cardiovascular:      Rate and Rhythm: Normal rate and regular rhythm.      Heart sounds: Normal heart sounds. No murmur heard.     No friction rub. No gallop.   Pulmonary:      Effort: Pulmonary effort is normal.      Breath sounds: Normal breath sounds. No wheezing, rhonchi or rales.   Skin:     General: Skin is warm.   Neurological:      General: No focal deficit present.      Mental Status: She is alert and oriented to person, place, and time. Mental status is at baseline.         "

## 2024-11-14 ENCOUNTER — APPOINTMENT (OUTPATIENT)
Dept: LAB | Facility: HOSPITAL | Age: 59
End: 2024-11-14
Payer: COMMERCIAL

## 2024-11-14 ENCOUNTER — OFFICE VISIT (OUTPATIENT)
Dept: FAMILY MEDICINE CLINIC | Facility: CLINIC | Age: 59
End: 2024-11-14

## 2024-11-14 VITALS
SYSTOLIC BLOOD PRESSURE: 124 MMHG | BODY MASS INDEX: 24.41 KG/M2 | RESPIRATION RATE: 18 BRPM | HEIGHT: 64 IN | TEMPERATURE: 98.7 F | HEART RATE: 108 BPM | WEIGHT: 143 LBS | OXYGEN SATURATION: 98 % | DIASTOLIC BLOOD PRESSURE: 70 MMHG

## 2024-11-14 DIAGNOSIS — Z11.4 SCREENING FOR HIV (HUMAN IMMUNODEFICIENCY VIRUS): ICD-10-CM

## 2024-11-14 DIAGNOSIS — Z59.41 FOOD INSECURITY: ICD-10-CM

## 2024-11-14 DIAGNOSIS — F41.9 ANXIETY AND DEPRESSION: ICD-10-CM

## 2024-11-14 DIAGNOSIS — G43.709 CHRONIC MIGRAINE WITHOUT AURA WITHOUT STATUS MIGRAINOSUS, NOT INTRACTABLE: ICD-10-CM

## 2024-11-14 DIAGNOSIS — Z11.59 NEED FOR HEPATITIS C SCREENING TEST: ICD-10-CM

## 2024-11-14 DIAGNOSIS — G43.709 CHRONIC MIGRAINE WITHOUT AURA WITHOUT STATUS MIGRAINOSUS, NOT INTRACTABLE: Primary | ICD-10-CM

## 2024-11-14 DIAGNOSIS — F32.A ANXIETY AND DEPRESSION: ICD-10-CM

## 2024-11-14 LAB
ANION GAP SERPL CALCULATED.3IONS-SCNC: 9 MMOL/L (ref 4–13)
BUN SERPL-MCNC: 18 MG/DL (ref 5–25)
CALCIUM SERPL-MCNC: 10.1 MG/DL (ref 8.4–10.2)
CHLORIDE SERPL-SCNC: 101 MMOL/L (ref 96–108)
CO2 SERPL-SCNC: 30 MMOL/L (ref 21–32)
CREAT SERPL-MCNC: 1.12 MG/DL (ref 0.6–1.3)
GFR SERPL CREATININE-BSD FRML MDRD: 53 ML/MIN/1.73SQ M
GLUCOSE SERPL-MCNC: 78 MG/DL (ref 65–140)
POTASSIUM SERPL-SCNC: 4.1 MMOL/L (ref 3.5–5.3)
SODIUM SERPL-SCNC: 140 MMOL/L (ref 135–147)

## 2024-11-14 PROCEDURE — 87389 HIV-1 AG W/HIV-1&-2 AB AG IA: CPT

## 2024-11-14 PROCEDURE — 80048 BASIC METABOLIC PNL TOTAL CA: CPT

## 2024-11-14 PROCEDURE — 86803 HEPATITIS C AB TEST: CPT

## 2024-11-14 PROCEDURE — 36415 COLL VENOUS BLD VENIPUNCTURE: CPT

## 2024-11-14 PROCEDURE — 99213 OFFICE O/P EST LOW 20 MIN: CPT | Performed by: FAMILY MEDICINE

## 2024-11-14 RX ORDER — IBUPROFEN 600 MG/1
TABLET, FILM COATED ORAL
Qty: 30 TABLET | Refills: 2 | Status: SHIPPED | OUTPATIENT
Start: 2024-11-14

## 2024-11-14 RX ORDER — TOPIRAMATE 25 MG/1
TABLET, FILM COATED ORAL
Qty: 60 TABLET | Refills: 3 | Status: SHIPPED | OUTPATIENT
Start: 2024-11-14

## 2024-11-14 RX ORDER — ESCITALOPRAM OXALATE 20 MG/1
20 TABLET ORAL DAILY
Qty: 30 TABLET | Refills: 1 | Status: SHIPPED | OUTPATIENT
Start: 2024-11-14

## 2024-11-14 SDOH — ECONOMIC STABILITY - FOOD INSECURITY: FOOD INSECURITY: Z59.41

## 2024-11-14 NOTE — PROGRESS NOTES
Name: Mariana Gaitan      : 1965      MRN: [id number]  Encounter Provider: Rebecca Fay Kab-Perlman, MD  Encounter Date: 2024   Encounter department: [facility]  :  Assessment & Plan  Chronic migraine without aura without status migrainosus, not intractable  -on last office visit retried consistent use of topiramate for prevention  -She does state that she had headaches before age 51 however they would occur maybe once monthly and it has gotten progressively worse in the last year  -In addition between ages of 8, 9, and 10 she had fainting spells that resolved on their own  -has been using topamax one to two tablets twice daily  -wakes up because of headaches around 3/4 am in the morning and then falls asleep again but will wake up later with headaches first thing in the morning   -previously prescribed nurtex ODT however too expensive and insurance won't cover it  -it is possible that this is not in fact migraine headaches    PLAN  -ordered MRI head with gadolinium today because has had headaches begin after age 49 and has progressively worsened through the years; looking for aneurysm vs. Mass vs. Anatomical abnormality   -recommended topamax 50mg twice daily and prescribed more; counseled on adverse side effect of peripheral neuropathy  -today provided with ibuprofen for abortive therapy with precautions  -ordered BMP today to evaluate kidney function prior to MRI and get updated electrolytes   -inquire again on follow-up visit the nature of the headaches to see if can better classify headache type    Orders:    MRI abdomen w wo contrast; Future    topiramate (Topamax) 25 mg tablet; Take one tablet twice daily. Can take up to two tablets twice daily.    Basic metabolic panel; Future    ibuprofen (MOTRIN) 600 mg tablet; Take one tablet every 6 hours as needed for headache. Do not take at same time as escitalopram by several hours    Food insecurity  -not interested  "in social work referral today  -solely supports herself    PLAN  -provided with [employer] website to look into food pantries       Anxiety and depression  Depression Screening Follow-up Plan: Patient's depression screening was positive with a PHQ-9 score of 9. Patient advised to follow-up with PCP for further management.  -PHQ-9 score of 9 today and IVÁN-7 score of 11 today   -home medication lexapro 10mg daily  -continues to have insomnia; states melatonin 3mg not effective however taking one hour before bed  -No SI/HI/AVH    PLAN  -increased lexapro to 20mg daily  -recommended taking melatonin 2 to 3 hours before bed   -f/u 3 weeks    Orders:    escitalopram (LEXAPRO) 20 mg tablet; Take 1 tablet (20 mg total) by mouth daily           History of Present Illness     Mariana Gaitan is a 60 yo female patient presenting today to f/u regarding her chronic migraines.A friend gave her quitiapine for sleep and it helped \"the best three nights of sleep I have ever had. Regarding her headache, states that her face and entire head hurt, not specific to trigeminal nerve. States that her teeth sometimes hurt too. No facial tingling. States she gets headaches nightly that cause her to wake up and every morning wakes up with a headache.     Review of Systems   Constitutional:  Negative for fever.   HENT:  Negative for tinnitus and voice change.    Eyes:  Negative for photophobia and visual disturbance.   Cardiovascular:  Negative for chest pain and palpitations.   Skin:  Negative for rash.   Neurological:  Positive for headaches.   Hematological:  Does not bruise/bleed easily.   Psychiatric/Behavioral:  Positive for sleep disturbance. Negative for agitation, behavioral problems, confusion, hallucinations and suicidal ideas. The patient is not nervous/anxious.      Chronic migraine    Medical History Reviewed by provider this encounter:       Chronic migraine    Medical History Reviewed by provider this encounter:       Chronic " migraine      Medical History Reviewed by provider this encounter:     .  Past Medical History   No past medical history on file.  Past Surgical History:   Procedure Laterality Date    CHOLECYSTECTOMY LAPAROSCOPIC N/A 1/15/2024    Procedure: CHOLECYSTECTOMY LAPAROSCOPIC;  Surgeon: Kelby Perkins MD;  Location: [facility] OR;  Service: General     Family History   Problem Relation Age of Onset    Lung cancer Sister     Cancer Son     Cancer Paternal Uncle     Asthma Son       reports that she has quit smoking. She has never been exposed to tobacco smoke. She has never used smokeless tobacco. She reports current alcohol use. She reports that she does not use drugs.  Current Outpatient Medications on File Prior to Visit   Medication Sig Dispense Refill    escitalopram (LEXAPRO) 10 mg tablet Take 1 tablet (10 mg total) by mouth daily 30 tablet 5    hydrOXYzine HCL (ATARAX) 25 mg tablet Take 1 tablet (25 mg total) by mouth every 6 (six) hours as needed for itching 60 tablet 1    melatonin 3 mg Take 1 tablet (3 mg total) by mouth daily at bedtime 60 tablet 1    Blood Pressure KIT Use in the morning 1 kit 0    rimegepant sulfate (Nurtec) 75 mg TBDP Take one tablet every other day for prevention and as needed for abortive therapy not to exceed 75mg over a 24 hour period. 60 tablet 0     No current facility-administered medications on file prior to visit.     Allergies   Allergen Reactions    Shellfish Allergy - Food Allergy Anaphylaxis    Iodine - Food Allergy Swelling      Current Outpatient Medications on File Prior to Visit   Medication Sig Dispense Refill    escitalopram (LEXAPRO) 10 mg tablet Take 1 tablet (10 mg total) by mouth daily 30 tablet 5    hydrOXYzine HCL (ATARAX) 25 mg tablet Take 1 tablet (25 mg total) by mouth every 6 (six) hours as needed for itching 60 tablet 1    melatonin 3 mg Take 1 tablet (3 mg total) by mouth daily at bedtime 60 tablet 1    Blood Pressure KIT Use in the morning 1 kit 0    rimegepant  "sulfate (Nurtec) 75 mg TBDP Take one tablet every other day for prevention and as needed for abortive therapy not to exceed 75mg over a 24 hour period. 60 tablet 0     No current facility-administered medications on file prior to visit.      Social History     Tobacco Use    Smoking status: Former     Passive exposure: Never    Smokeless tobacco: Never   Vaping Use    Vaping status: Never Used   Substance and Sexual Activity    Alcohol use: Yes    Drug use: Never    Sexual activity: Not Currently     Comment: 12 years         Objective   /70 (BP Location: Right arm, Patient Position: Sitting, Cuff Size: Standard)   Pulse (!) 108   Temp 98.7 °F (37.1 °C) (Temporal)   Resp 18   Ht 5' 4\" (1.626 m)   Wt 64.9 kg (143 lb)   SpO2 98%   BMI 24.55 kg/m²      Physical Exam  Constitutional:       Appearance: Normal appearance. She is normal weight.   HENT:      Head: Normocephalic and atraumatic.   Eyes:      Conjunctiva/sclera: Conjunctivae normal.   Cardiovascular:      Rate and Rhythm: Normal rate and regular rhythm.      Heart sounds: Normal heart sounds. No murmur heard.     No friction rub. No gallop.   Pulmonary:      Effort: Pulmonary effort is normal.      Breath sounds: Normal breath sounds. No wheezing, rhonchi or rales.   Musculoskeletal:      Cervical back: Normal range of motion.      Comments: No muscle spasm of back, trapezius, or neck   Skin:     General: Skin is warm.   Neurological:      Mental Status: She is alert and oriented to person, place, and time.   Psychiatric:         Mood and Affect: Mood normal.         Behavior: Behavior normal.         "

## 2024-11-14 NOTE — ASSESSMENT & PLAN NOTE
-on last office visit retried consistent use of topiramate for prevention  -She does state that she had headaches before age 51 however they would occur maybe once monthly and it has gotten progressively worse in the last year  -In addition between ages of 8, 9, and 10 she had fainting spells that resolved on their own  -has been using topamax one to two tablets twice daily  -wakes up because of headaches around 3/4 am in the morning and then falls asleep again but will wake up later with headaches first thing in the morning   -previously prescribed nurtex ODT however too expensive and insurance won't cover it  -it is possible that this is not in fact migraine headaches    PLAN  -ordered MRI head with gadolinium today because has had headaches begin after age 49 and has progressively worsened through the years; looking for aneurysm vs. Mass vs. Anatomical abnormality   -recommended topamax 50mg twice daily and prescribed more; counseled on adverse side effect of peripheral neuropathy  -today provided with ibuprofen for abortive therapy with precautions  -ordered BMP today to evaluate kidney function prior to MRI and get updated electrolytes   -inquire again on follow-up visit the nature of the headaches to see if can better classify headache type    Orders:    MRI abdomen w wo contrast; Future    topiramate (Topamax) 25 mg tablet; Take one tablet twice daily. Can take up to two tablets twice daily.    Basic metabolic panel; Future    ibuprofen (MOTRIN) 600 mg tablet; Take one tablet every 6 hours as needed for headache. Do not take at same time as escitalopram by several hours

## 2024-11-14 NOTE — ASSESSMENT & PLAN NOTE
Depression Screening Follow-up Plan: Patient's depression screening was positive with a PHQ-9 score of 9. Patient advised to follow-up with PCP for further management.  -PHQ-9 score of 9 today and IVÁN-7 score of 11 today   -home medication lexapro 10mg daily  -continues to have insomnia; states melatonin 3mg not effective however taking one hour before bed  -No SI/HI/AVH    PLAN  -increased lexapro to 20mg daily  -recommended taking melatonin 2 to 3 hours before bed   -f/u 3 weeks    Orders:    escitalopram (LEXAPRO) 20 mg tablet; Take 1 tablet (20 mg total) by mouth daily

## 2024-11-15 LAB
HCV AB SER QL: NORMAL
HIV 1+2 AB+HIV1 P24 AG SERPL QL IA: NORMAL
HIV 2 AB SERPL QL IA: NORMAL
HIV1 AB SERPL QL IA: NORMAL
HIV1 P24 AG SERPL QL IA: NORMAL

## 2024-11-26 ENCOUNTER — TELEPHONE (OUTPATIENT)
Dept: FAMILY MEDICINE CLINIC | Facility: CLINIC | Age: 59
End: 2024-11-26

## 2024-11-26 NOTE — TELEPHONE ENCOUNTER
This patient was scheduled for MRI BRAIN W WO CONTRAST  . The information I submitted was not enough to get it approved, so [facility] is requesting a [id number]. If you are able to do so, the number is below. If you wish to withdraw this request let us know so we can call central scheduling to cancel their upcoming appointment.            [facility]:  [phone]  Tracking Number: [id number]   Insurance: [facility] [employer]  ID#: [id number]   Attending: Rebecca Fay Kab-Perlman  NPI: [id number]   Date of appt.- 11/27/24              This case is time sensitive, please response within 24hrs of this message. Thank you.        Please respond with name and call refence number of you spoke with for I can document

## 2024-12-11 ENCOUNTER — APPOINTMENT (OUTPATIENT)
Dept: RADIOLOGY | Facility: HOSPITAL | Age: 59
End: 2024-12-11
Payer: COMMERCIAL

## 2024-12-11 ENCOUNTER — TELEPHONE (OUTPATIENT)
Dept: FAMILY MEDICINE CLINIC | Facility: CLINIC | Age: 59
End: 2024-12-11

## 2024-12-11 ENCOUNTER — HOSPITAL ENCOUNTER (OUTPATIENT)
Dept: MRI IMAGING | Facility: HOSPITAL | Age: 59
Discharge: HOME | End: 2024-12-11
Payer: COMMERCIAL

## 2024-12-11 DIAGNOSIS — R89.9 ABNORMAL LABORATORY TEST: ICD-10-CM

## 2024-12-11 DIAGNOSIS — G43.709 CHRONIC MIGRAINE WITHOUT AURA WITHOUT STATUS MIGRAINOSUS, NOT INTRACTABLE: ICD-10-CM

## 2024-12-11 DIAGNOSIS — G43.709 CHRONIC MIGRAINE WITHOUT AURA WITHOUT STATUS MIGRAINOSUS, NOT INTRACTABLE: Primary | ICD-10-CM

## 2024-12-11 PROCEDURE — 70553 MRI BRAIN STEM W/O & W/DYE: CPT

## 2024-12-11 PROCEDURE — A9585 GADOBUTROL INJECTION: HCPCS

## 2024-12-11 RX ORDER — GADOBUTROL 604.72 MG/ML
6 INJECTION INTRAVENOUS
Status: COMPLETED | OUTPATIENT
Start: 2024-12-11 | End: 2024-12-11

## 2024-12-11 RX ADMIN — GADOBUTROL 6 ML: 604.72 INJECTION INTRAVENOUS at 17:32

## 2024-12-13 ENCOUNTER — RESULTS FOLLOW-UP (OUTPATIENT)
Dept: FAMILY MEDICINE CLINIC | Facility: CLINIC | Age: 59
End: 2024-12-13

## 2024-12-17 ENCOUNTER — TELEPHONE (OUTPATIENT)
Dept: FAMILY MEDICINE CLINIC | Facility: CLINIC | Age: 59
End: 2024-12-17

## 2024-12-17 NOTE — TELEPHONE ENCOUNTER
Hey, good morning. I would like to change the time on my schedule for Friday. I would like it to be 10:00. OK. Mariana Gaitan 1965 I would like my appointment date Friday but the time changed to 10:00 Thank you, God blesincere. Bye bye.      Called patient and moved appointment to 10 am.

## 2024-12-20 ENCOUNTER — OFFICE VISIT (OUTPATIENT)
Dept: FAMILY MEDICINE CLINIC | Facility: CLINIC | Age: 59
End: 2024-12-20

## 2024-12-20 VITALS
BODY MASS INDEX: 24.48 KG/M2 | HEIGHT: 64 IN | OXYGEN SATURATION: 100 % | WEIGHT: 143.4 LBS | SYSTOLIC BLOOD PRESSURE: 120 MMHG | HEART RATE: 83 BPM | RESPIRATION RATE: 14 BRPM | DIASTOLIC BLOOD PRESSURE: 70 MMHG | TEMPERATURE: 96.2 F

## 2024-12-20 DIAGNOSIS — G47.00 INSOMNIA, UNSPECIFIED TYPE: ICD-10-CM

## 2024-12-20 DIAGNOSIS — G43.709 CHRONIC MIGRAINE WITHOUT AURA WITHOUT STATUS MIGRAINOSUS, NOT INTRACTABLE: Primary | ICD-10-CM

## 2024-12-20 PROCEDURE — 99213 OFFICE O/P EST LOW 20 MIN: CPT

## 2024-12-20 RX ORDER — DOXEPIN HYDROCHLORIDE 10 MG/1
10 CAPSULE ORAL
Qty: 30 CAPSULE | Refills: 0 | Status: SHIPPED | OUTPATIENT
Start: 2024-12-20

## 2024-12-20 NOTE — ASSESSMENT & PLAN NOTE
-Recent MRI brain unremarkable  -Will continue topamax for one more month for a total of two months to see if effective    PLAN  -if topamax not effective will try propranolol  -if still not effective will consider referral to neurology for MAB's   -education/counseling regarding chronic migraines including that there is limited studies, that even with acute migraines successful treatment is considered a 50% decrease in headaches, and that a trail period of prophylaxis should be at least 2 months; patient expressed understanding  -on f/u change ibuprofen for abortive therapy to naproxen; also consider sumatriptan, does not have CVA disease or hypertension

## 2024-12-20 NOTE — PROGRESS NOTES
Name: Mariana Gaitan      : 1965      MRN: [id number]  Encounter Provider: Rebecca Fay Kab-Perlman, MD  Encounter Date: 2024   Encounter department: [facility]  :  Assessment & Plan  Chronic migraine without aura without status migrainosus, not intractable  -Recent MRI brain unremarkable  -Will continue topamax for one more month for a total of two months to see if effective    PLAN  -if topamax not effective will try propranolol  -if still not effective will consider referral to neurology for MAB's   -education/counseling regarding chronic migraines including that there is limited studies, that even with acute migraines successful treatment is considered a 50% decrease in headaches, and that a trail period of prophylaxis should be at least 2 months; patient expressed understanding  -on f/u change ibuprofen for abortive therapy to naproxen; also consider sumatriptan, does not have CVA disease or hypertension       Insomnia, unspecified type  -may be secondary to headaches  -difficulty falling and staying asleep  -has made appropriate sleep hygiene changes without effectiveness  -although we did not perform a PHQ-9 today in the past she was negative    PLAN  -will try doxepin for the next month and proceed form there  -May be good candidate for belsomra    Orders:    doxepin (SINEquan) 10 mg capsule; Take 1 capsule (10 mg total) by mouth daily at bedtime           History of Present Illness     Mariana Gaitan is a 60 yo female patient presenting today to discuss her chronic migraines. Because she is greater than 50 years old with new conset migraines we completed a brain MRI which was unremarkable. She is on both abortive and prophylactic medication with minimal relief. We reviewed that she gets pulsating one sided pain, that she has photophobia and phonophobia however no aura and no nausea.      Review of Systems   Constitutional:  Negative for fatigue and fever.  "  HENT:  Negative for hearing loss.    Eyes:  Negative for visual disturbance.   Gastrointestinal:  Negative for abdominal pain and nausea.   Skin:  Negative for rash.   Neurological:  Positive for headaches. Negative for dizziness, weakness, light-headedness and numbness.   Hematological:  Does not bruise/bleed easily.       Objective   /70 (BP Location: Left arm, Patient Position: Sitting, Cuff Size: Standard)   Pulse 83   Temp (!) 96.2 °F (35.7 °C) (Temporal)   Resp 14   Ht 5' 4\" (1.626 m)   Wt 65 kg (143 lb 6.4 oz)   SpO2 100%   BMI 24.61 kg/m²      Physical Exam  Constitutional:       Appearance: Normal appearance.   HENT:      Head: Normocephalic and atraumatic.   Eyes:      Conjunctiva/sclera: Conjunctivae normal.   Cardiovascular:      Rate and Rhythm: Normal rate and regular rhythm.      Heart sounds: Normal heart sounds. No murmur heard.     No friction rub. No gallop.   Pulmonary:      Breath sounds: No wheezing, rhonchi or rales.   Abdominal:      General: Abdomen is flat. Bowel sounds are normal. There is no distension.      Palpations: Abdomen is soft.      Tenderness: There is no abdominal tenderness.   Skin:     General: Skin is warm.      Findings: No rash.   Neurological:      General: No focal deficit present.      Mental Status: She is alert and oriented to person, place, and time.   Psychiatric:         Mood and Affect: Mood normal.         Behavior: Behavior normal.       "

## 2024-12-23 DIAGNOSIS — Z01.00 EYE EXAM, ROUTINE: Primary | ICD-10-CM

## 2024-12-23 NOTE — PROGRESS NOTES
Mariana called my [employer] teams number requesting a referral to optometry and provided the fax number to send it to namely [phone]. Referral placed, printed, and pit into to be faxed bin today

## 2024-12-26 ENCOUNTER — TELEPHONE (OUTPATIENT)
Dept: FAMILY MEDICINE CLINIC | Facility: CLINIC | Age: 59
End: 2024-12-26

## 2024-12-26 NOTE — TELEPHONE ENCOUNTER
Pt requested An Ambulatory Referral to Optometry be faxed to [phone] and it was faxed to that number several times but would not go through. I called pt to inform her and Got the voicemail. Left a detailed message to call us back with instructions.     Referral was placed in the Pt.  bin under R

## 2024-12-30 ENCOUNTER — VBI (OUTPATIENT)
Dept: ADMINISTRATIVE | Facility: OTHER | Age: 59
End: 2024-12-30

## 2024-12-30 ENCOUNTER — TELEPHONE (OUTPATIENT)
Dept: FAMILY MEDICINE CLINIC | Facility: CLINIC | Age: 59
End: 2024-12-30

## 2024-12-30 NOTE — TELEPHONE ENCOUNTER
Mariana called my [employer] teams number requesting the eye glasses prescription be faxed over stating the optometrist didn't receive the prescription, however they did receive the referral. I explained that the optometrist provides the prescription and she will need to reach out to them. She stated she will check her bag to see if she has it and if not reach out to optometry.

## 2024-12-30 NOTE — TELEPHONE ENCOUNTER
12/30/24 10:34 AM     Chart reviewed for Pap Smear (HPV) aka Cervical Cancer Screening ; nothing is submitted to the patient's insurance at this time.     Joanna Escobar MA   [facility]

## 2025-01-12 DIAGNOSIS — G47.00 INSOMNIA, UNSPECIFIED TYPE: ICD-10-CM

## 2025-01-13 DIAGNOSIS — F41.9 ANXIETY AND DEPRESSION: ICD-10-CM

## 2025-01-13 DIAGNOSIS — F32.A ANXIETY AND DEPRESSION: ICD-10-CM

## 2025-01-13 RX ORDER — ESCITALOPRAM OXALATE 20 MG/1
20 TABLET ORAL DAILY
Qty: 30 TABLET | Refills: 1 | Status: SHIPPED | OUTPATIENT
Start: 2025-01-13

## 2025-01-16 RX ORDER — DOXEPIN HYDROCHLORIDE 10 MG/1
10 CAPSULE ORAL
Qty: 30 CAPSULE | Refills: 0 | Status: SHIPPED | OUTPATIENT
Start: 2025-01-16

## 2025-01-30 DIAGNOSIS — F32.A ANXIETY AND DEPRESSION: ICD-10-CM

## 2025-01-30 DIAGNOSIS — G43.709 CHRONIC MIGRAINE WITHOUT AURA WITHOUT STATUS MIGRAINOSUS, NOT INTRACTABLE: ICD-10-CM

## 2025-01-30 DIAGNOSIS — F41.9 ANXIETY AND DEPRESSION: ICD-10-CM

## 2025-01-31 ENCOUNTER — HOSPITAL ENCOUNTER (OUTPATIENT)
Dept: MAMMOGRAPHY | Facility: CLINIC | Age: 60
End: 2025-01-31
Payer: COMMERCIAL

## 2025-01-31 VITALS — BODY MASS INDEX: 24.41 KG/M2 | WEIGHT: 143 LBS | HEIGHT: 64 IN

## 2025-01-31 DIAGNOSIS — Z12.31 SCREENING MAMMOGRAM FOR BREAST CANCER: ICD-10-CM

## 2025-01-31 PROCEDURE — 77067 SCR MAMMO BI INCL CAD: CPT

## 2025-01-31 PROCEDURE — 77063 BREAST TOMOSYNTHESIS BI: CPT

## 2025-02-02 RX ORDER — ESCITALOPRAM OXALATE 10 MG/1
10 TABLET ORAL DAILY
Qty: 30 TABLET | Refills: 5 | OUTPATIENT
Start: 2025-02-02

## 2025-02-02 RX ORDER — IBUPROFEN 600 MG/1
TABLET, FILM COATED ORAL
Qty: 30 TABLET | Refills: 2 | OUTPATIENT
Start: 2025-02-02

## 2025-02-11 ENCOUNTER — RESULTS FOLLOW-UP (OUTPATIENT)
Dept: FAMILY MEDICINE CLINIC | Facility: CLINIC | Age: 60
End: 2025-02-11

## 2025-02-16 DIAGNOSIS — G47.00 INSOMNIA, UNSPECIFIED TYPE: ICD-10-CM

## 2025-02-17 RX ORDER — DOXEPIN HYDROCHLORIDE 10 MG/1
10 CAPSULE ORAL
Qty: 30 CAPSULE | Refills: 0 | Status: SHIPPED | OUTPATIENT
Start: 2025-02-17

## 2025-03-05 ENCOUNTER — OFFICE VISIT (OUTPATIENT)
Dept: FAMILY MEDICINE CLINIC | Facility: CLINIC | Age: 60
End: 2025-03-05

## 2025-03-05 VITALS
HEIGHT: 64 IN | DIASTOLIC BLOOD PRESSURE: 90 MMHG | WEIGHT: 152.7 LBS | OXYGEN SATURATION: 98 % | TEMPERATURE: 98.2 F | RESPIRATION RATE: 18 BRPM | SYSTOLIC BLOOD PRESSURE: 144 MMHG | HEART RATE: 98 BPM | BODY MASS INDEX: 26.07 KG/M2

## 2025-03-05 DIAGNOSIS — G89.29 CHRONIC PAIN OF BOTH SHOULDERS: Primary | ICD-10-CM

## 2025-03-05 DIAGNOSIS — M25.511 CHRONIC PAIN OF BOTH SHOULDERS: Primary | ICD-10-CM

## 2025-03-05 DIAGNOSIS — M25.512 CHRONIC PAIN OF BOTH SHOULDERS: Primary | ICD-10-CM

## 2025-03-05 PROCEDURE — 99213 OFFICE O/P EST LOW 20 MIN: CPT

## 2025-03-05 RX ORDER — METHOCARBAMOL 500 MG/1
500 TABLET, FILM COATED ORAL 4 TIMES DAILY
Qty: 90 TABLET | Refills: 0 | Status: SHIPPED | OUTPATIENT
Start: 2025-03-05

## 2025-03-05 RX ORDER — IBUPROFEN 600 MG/1
TABLET, FILM COATED ORAL
Qty: 30 TABLET | Refills: 2 | Status: SHIPPED | OUTPATIENT
Start: 2025-03-05

## 2025-03-05 NOTE — PROGRESS NOTES
Name: Mariana Gaitan      : 1965      MRN: [id number]  Encounter Provider: BRANDEE Gutierrez  Encounter Date: 3/5/2025   Encounter department: [facility]  :  Assessment & Plan  Chronic pain of both shoulders    Orders:    ibuprofen (MOTRIN) 600 mg tablet; Take one tablet every 6 hours as needed for headache. Do not take at same time as escitalopram by several hours    methocarbamol (ROBAXIN) 500 mg tablet; Take 1 tablet (500 mg total) by mouth 4 (four) times a day    Ambulatory Referral to Physical Therapy; Future    Ambulatory Referral to Chiropractic; Future           History of Present Illness   Mariana Gaitan is a 59 y.o. female  has no past medical history on file.  has a past surgical history that includes CHOLECYSTECTOMY LAPAROSCOPIC (N/A, 01/15/2024) and Augmentation mammaplasty (Bilateral).        Patient is a 59 year old female with past medical HX: anxiety, depression, HTN, chronic migraine who is  presenting with reports of cervical back & bilateral shoulder pain. 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 Her pain can reach a 10/10, her pain is exacerbated with lifting her patients, patient has not taken any medication to relieve her pain. Patient able to perform range of motion without joint pain, she does endorse pain with resistive motion to upper limb. She is open to trying PT if it would help her.       Review of Systems   Constitutional:  Negative for chills and fever.   HENT:  Negative for ear pain and sore throat.    Eyes:  Negative for pain and visual disturbance.   Respiratory:  Negative for cough and shortness of breath.    Cardiovascular:  Negative for chest pain and palpitations.   Gastrointestinal:  Negative for abdominal pain and vomiting.   Genitourinary:  Negative for dysuria and hematuria.   Musculoskeletal:  Positive for arthralgias, back pain and  "myalgias.   Skin:  Negative for color change and rash.   Neurological:  Negative for seizures and syncope.   All other systems reviewed and are negative.      Objective   /90 (BP Location: Left arm, Patient Position: Sitting, Cuff Size: Standard)   Pulse 98   Temp 98.2 °F (36.8 °C) (Temporal)   Resp 18   Ht 5' 4\" (1.626 m)   Wt 69.3 kg (152 lb 11.2 oz)   SpO2 98%   BMI 26.21 kg/m²      Physical Exam  Vitals and nursing note reviewed.   Constitutional:       General: She is not in acute distress.     Appearance: Normal appearance. She is well-developed.   HENT:      Head: Normocephalic and atraumatic.      Right Ear: External ear normal.      Left Ear: External ear normal.      Nose: Nose normal.   Eyes:      Conjunctiva/sclera: Conjunctivae normal.   Cardiovascular:      Rate and Rhythm: Normal rate and regular rhythm.      Pulses: Normal pulses.      Heart sounds: Normal heart sounds. No murmur heard.  Pulmonary:      Effort: Pulmonary effort is normal. No respiratory distress.      Breath sounds: Normal breath sounds.   Abdominal:      Palpations: Abdomen is soft.      Tenderness: There is no abdominal tenderness.   Musculoskeletal:         General: No swelling. Normal range of motion.      Right shoulder: Normal.      Left shoulder: Normal.      Cervical back: Normal range of motion and neck supple.      Comments: Bilateral shoulders: pain with range of motion   Skin:     General: Skin is warm and dry.      Capillary Refill: Capillary refill takes less than 2 seconds.   Neurological:      Mental Status: She is alert and oriented to person, place, and time. Mental status is at baseline.   Psychiatric:         Mood and Affect: Mood normal.         Behavior: Behavior normal.         Thought Content: Thought content normal.         Judgment: Judgment normal.         "

## 2025-03-13 DIAGNOSIS — F32.A ANXIETY AND DEPRESSION: ICD-10-CM

## 2025-03-13 DIAGNOSIS — F41.9 ANXIETY AND DEPRESSION: ICD-10-CM

## 2025-03-16 RX ORDER — ESCITALOPRAM OXALATE 20 MG/1
20 TABLET ORAL DAILY
Qty: 30 TABLET | Refills: 1 | Status: SHIPPED | OUTPATIENT
Start: 2025-03-16

## 2025-03-17 DIAGNOSIS — G47.00 INSOMNIA, UNSPECIFIED TYPE: ICD-10-CM

## 2025-03-17 RX ORDER — DOXEPIN HYDROCHLORIDE 10 MG/1
10 CAPSULE ORAL
Qty: 30 CAPSULE | Refills: 0 | Status: SHIPPED | OUTPATIENT
Start: 2025-03-17

## (undated) DEVICE — TROCAR: Brand: KII SLEEVE

## (undated) DEVICE — SUT VICRYL 0 UR-6 27 IN J603H

## (undated) DEVICE — TUBING SMOKE EVAC W/FILTRATION DEVICE PLUMEPORT ACTIV

## (undated) DEVICE — [HIGH FLOW INSUFFLATOR,  DO NOT USE IF PACKAGE IS DAMAGED,  KEEP DRY,  KEEP AWAY FROM SUNLIGHT,  PROTECT FROM HEAT AND RADIOACTIVE SOURCES.]: Brand: PNEUMOSURE

## (undated) DEVICE — SCD SEQUENTIAL COMPRESSION COMFORT SLEEVE MEDIUM KNEE LENGTH: Brand: KENDALL SCD

## (undated) DEVICE — SUT MONOCRYL 4-0 PS-2 27 IN Y426H

## (undated) DEVICE — TISSUE RETRIEVAL SYSTEM: Brand: INZII RETRIEVAL SYSTEM

## (undated) DEVICE — SYRINGE 20ML LL

## (undated) DEVICE — DRAPE EQUIPMENT RF WAND

## (undated) DEVICE — 3000CC GUARDIAN II: Brand: GUARDIAN

## (undated) DEVICE — LIGAMAX 5 MM ENDOSCOPIC MULTIPLE CLIP APPLIER: Brand: LIGAMAX

## (undated) DEVICE — INTENDED FOR TISSUE SEPARATION, AND OTHER PROCEDURES THAT REQUIRE A SHARP SURGICAL BLADE TO PUNCTURE OR CUT.: Brand: BARD-PARKER SAFETY BLADES SIZE 11, STERILE

## (undated) DEVICE — TROCAR: Brand: KII FIOS FIRST ENTRY

## (undated) DEVICE — GLOVE SRG BIOGEL ECLIPSE 7.5

## (undated) DEVICE — DISPOSABLE OR TOWEL: Brand: CARDINAL HEALTH

## (undated) DEVICE — CHLORAPREP HI-LITE 26ML ORANGE

## (undated) DEVICE — METZENBAUM ADTEC SINGLE USE DISSECTING SCISSORS, SHAFT ONLY, MONOPOLAR, CURVED TO LEFT, WORKING LENGTH: 12 1/4", (310 MM), DIAM. 5 MM, INSULATED, DOUBLE ACTION, STERILE, DISPOSABLE, PACKAGE OF 10 PIECES: Brand: AESCULAP

## (undated) DEVICE — ADHESIVE SKIN HIGH VISCOSITY EXOFIN 1ML

## (undated) DEVICE — SYRINGE 30ML LL

## (undated) DEVICE — GLOVE INDICATOR PI UNDERGLOVE SZ 6.5 BLUE

## (undated) DEVICE — BLUE HEAT SCOPE WARMER

## (undated) DEVICE — GLOVE SRG BIOGEL 6.5

## (undated) DEVICE — PLUMEPEN PRO 10FT

## (undated) DEVICE — ELECTRODE LAP J HOOK SPLIT STEM E-Z CLEAN 33CM -0021S

## (undated) DEVICE — PMI DISPOSABLE PUNCTURE CLOSURE DEVICE / SUTURE GRASPER: Brand: PMI

## (undated) DEVICE — GLOVE INDICATOR PI UNDERGLOVE SZ 8 BLUE

## (undated) DEVICE — ALLENTOWN LAP CHOLE APP PACK: Brand: CARDINAL HEALTH